# Patient Record
Sex: MALE | Race: WHITE | NOT HISPANIC OR LATINO | URBAN - METROPOLITAN AREA
[De-identification: names, ages, dates, MRNs, and addresses within clinical notes are randomized per-mention and may not be internally consistent; named-entity substitution may affect disease eponyms.]

---

## 2023-09-18 ENCOUNTER — OFFICE (AMBULATORY)
Dept: URBAN - METROPOLITAN AREA PATHOLOGY 4 | Facility: PATHOLOGY | Age: 63
End: 2023-09-18
Payer: COMMERCIAL

## 2023-09-18 ENCOUNTER — ON CAMPUS - OUTPATIENT (AMBULATORY)
Dept: URBAN - METROPOLITAN AREA HOSPITAL 2 | Facility: HOSPITAL | Age: 63
End: 2023-09-18
Payer: COMMERCIAL

## 2023-09-18 VITALS
HEIGHT: 69 IN | SYSTOLIC BLOOD PRESSURE: 113 MMHG | SYSTOLIC BLOOD PRESSURE: 108 MMHG | SYSTOLIC BLOOD PRESSURE: 111 MMHG | HEART RATE: 66 BPM | SYSTOLIC BLOOD PRESSURE: 130 MMHG | DIASTOLIC BLOOD PRESSURE: 94 MMHG | HEART RATE: 67 BPM | HEART RATE: 71 BPM | DIASTOLIC BLOOD PRESSURE: 84 MMHG | OXYGEN SATURATION: 97 % | DIASTOLIC BLOOD PRESSURE: 88 MMHG | DIASTOLIC BLOOD PRESSURE: 80 MMHG | OXYGEN SATURATION: 95 % | HEART RATE: 62 BPM | HEART RATE: 57 BPM | DIASTOLIC BLOOD PRESSURE: 83 MMHG | SYSTOLIC BLOOD PRESSURE: 140 MMHG | SYSTOLIC BLOOD PRESSURE: 112 MMHG | SYSTOLIC BLOOD PRESSURE: 114 MMHG | OXYGEN SATURATION: 96 % | TEMPERATURE: 98.4 F | WEIGHT: 243 LBS | HEART RATE: 63 BPM | RESPIRATION RATE: 18 BRPM | HEART RATE: 70 BPM | RESPIRATION RATE: 16 BRPM | OXYGEN SATURATION: 99 % | DIASTOLIC BLOOD PRESSURE: 75 MMHG | DIASTOLIC BLOOD PRESSURE: 86 MMHG | SYSTOLIC BLOOD PRESSURE: 117 MMHG

## 2023-09-18 DIAGNOSIS — D12.2 BENIGN NEOPLASM OF ASCENDING COLON: ICD-10-CM

## 2023-09-18 DIAGNOSIS — Z86.010 PERSONAL HISTORY OF COLONIC POLYPS: ICD-10-CM

## 2023-09-18 DIAGNOSIS — K62.1 RECTAL POLYP: ICD-10-CM

## 2023-09-18 DIAGNOSIS — Z09 ENCOUNTER FOR FOLLOW-UP EXAMINATION AFTER COMPLETED TREATMEN: ICD-10-CM

## 2023-09-18 PROBLEM — K63.5 POLYP OF COLON: Status: ACTIVE | Noted: 2023-09-18

## 2023-09-18 LAB
GI HISTOLOGY: A. UNSPECIFIED: (no result)
GI HISTOLOGY: B. UNSPECIFIED: (no result)
GI HISTOLOGY: C. UNSPECIFIED: (no result)
GI HISTOLOGY: PDF REPORT: (no result)

## 2023-09-18 PROCEDURE — 45385 COLONOSCOPY W/LESION REMOVAL: CPT | Mod: 33 | Performed by: INTERNAL MEDICINE

## 2023-09-18 PROCEDURE — 88305 TISSUE EXAM BY PATHOLOGIST: CPT | Performed by: INTERNAL MEDICINE

## 2023-09-18 RX ADMIN — ONDANSETRON HYDROCHLORIDE 4 MG: 4 SOLUTION ORAL at 10:19

## 2024-04-23 ENCOUNTER — APPOINTMENT (OUTPATIENT)
Dept: CT IMAGING | Facility: HOSPITAL | Age: 64
End: 2024-04-23
Payer: COMMERCIAL

## 2024-04-23 ENCOUNTER — APPOINTMENT (OUTPATIENT)
Dept: MRI IMAGING | Facility: HOSPITAL | Age: 64
End: 2024-04-23
Payer: COMMERCIAL

## 2024-04-23 ENCOUNTER — HOSPITAL ENCOUNTER (OUTPATIENT)
Facility: HOSPITAL | Age: 64
Setting detail: OBSERVATION
LOS: 1 days | Discharge: HOME | End: 2024-04-25
Attending: EMERGENCY MEDICINE
Payer: COMMERCIAL

## 2024-04-23 DIAGNOSIS — R55 NEAR SYNCOPE: ICD-10-CM

## 2024-04-23 DIAGNOSIS — R00.1 BRADYCARDIA: ICD-10-CM

## 2024-04-23 DIAGNOSIS — R42 DIZZINESS: Primary | ICD-10-CM

## 2024-04-23 PROBLEM — J45.909 ASTHMA: Status: ACTIVE | Noted: 2024-04-23

## 2024-04-23 PROBLEM — G47.33 OSA (OBSTRUCTIVE SLEEP APNEA): Status: ACTIVE | Noted: 2024-04-23

## 2024-04-23 PROBLEM — E66.9 OBESITY (BMI 30-39.9): Status: ACTIVE | Noted: 2024-04-23

## 2024-04-23 LAB
ANION GAP SERPL CALCULATED.3IONS-SCNC: 12.2 MMOL/L (ref 5–15)
BASOPHILS # BLD AUTO: 0.04 10*3/MM3 (ref 0–0.2)
BASOPHILS NFR BLD AUTO: 0.6 % (ref 0–1.5)
BUN SERPL-MCNC: 12 MG/DL (ref 8–23)
BUN/CREAT SERPL: 16.9 (ref 7–25)
CALCIUM SPEC-SCNC: 9.3 MG/DL (ref 8.6–10.5)
CHLORIDE SERPL-SCNC: 103 MMOL/L (ref 98–107)
CO2 SERPL-SCNC: 22.8 MMOL/L (ref 22–29)
CREAT SERPL-MCNC: 0.71 MG/DL (ref 0.76–1.27)
DEPRECATED RDW RBC AUTO: 42.9 FL (ref 37–54)
EGFRCR SERPLBLD CKD-EPI 2021: 103.1 ML/MIN/1.73
EOSINOPHIL # BLD AUTO: 0.21 10*3/MM3 (ref 0–0.4)
EOSINOPHIL NFR BLD AUTO: 3.2 % (ref 0.3–6.2)
ERYTHROCYTE [DISTWIDTH] IN BLOOD BY AUTOMATED COUNT: 12.5 % (ref 12.3–15.4)
GLUCOSE BLDC GLUCOMTR-MCNC: 156 MG/DL (ref 70–130)
GLUCOSE SERPL-MCNC: 146 MG/DL (ref 65–99)
HCT VFR BLD AUTO: 49.4 % (ref 37.5–51)
HGB BLD-MCNC: 16.3 G/DL (ref 13–17.7)
IMM GRANULOCYTES # BLD AUTO: 0.01 10*3/MM3 (ref 0–0.05)
IMM GRANULOCYTES NFR BLD AUTO: 0.2 % (ref 0–0.5)
LYMPHOCYTES # BLD AUTO: 2.21 10*3/MM3 (ref 0.7–3.1)
LYMPHOCYTES NFR BLD AUTO: 34.1 % (ref 19.6–45.3)
MCH RBC QN AUTO: 30.4 PG (ref 26.6–33)
MCHC RBC AUTO-ENTMCNC: 33 G/DL (ref 31.5–35.7)
MCV RBC AUTO: 92 FL (ref 79–97)
MONOCYTES # BLD AUTO: 0.52 10*3/MM3 (ref 0.1–0.9)
MONOCYTES NFR BLD AUTO: 8 % (ref 5–12)
NEUTROPHILS NFR BLD AUTO: 3.5 10*3/MM3 (ref 1.7–7)
NEUTROPHILS NFR BLD AUTO: 53.9 % (ref 42.7–76)
PLATELET # BLD AUTO: 234 10*3/MM3 (ref 140–450)
PMV BLD AUTO: 9.9 FL (ref 6–12)
POTASSIUM SERPL-SCNC: 4 MMOL/L (ref 3.5–5.2)
QT INTERVAL: 432 MS
QTC INTERVAL: 382 MS
RBC # BLD AUTO: 5.37 10*6/MM3 (ref 4.14–5.8)
SODIUM SERPL-SCNC: 138 MMOL/L (ref 136–145)
TROPONIN T SERPL HS-MCNC: 7 NG/L
TSH SERPL DL<=0.05 MIU/L-ACNC: 0.54 UIU/ML (ref 0.27–4.2)
WBC NRBC COR # BLD AUTO: 6.49 10*3/MM3 (ref 3.4–10.8)

## 2024-04-23 PROCEDURE — 80048 BASIC METABOLIC PNL TOTAL CA: CPT | Performed by: EMERGENCY MEDICINE

## 2024-04-23 PROCEDURE — 85025 COMPLETE CBC W/AUTO DIFF WBC: CPT | Performed by: EMERGENCY MEDICINE

## 2024-04-23 PROCEDURE — 25010000002 ONDANSETRON PER 1 MG: Performed by: EMERGENCY MEDICINE

## 2024-04-23 PROCEDURE — 99285 EMERGENCY DEPT VISIT HI MDM: CPT | Performed by: EMERGENCY MEDICINE

## 2024-04-23 PROCEDURE — 25810000003 SODIUM CHLORIDE 0.9 % SOLUTION: Performed by: INTERNAL MEDICINE

## 2024-04-23 PROCEDURE — 25010000002 LORAZEPAM PER 2 MG: Performed by: NURSE PRACTITIONER

## 2024-04-23 PROCEDURE — 25810000003 SODIUM CHLORIDE 0.9 % SOLUTION: Performed by: NURSE PRACTITIONER

## 2024-04-23 PROCEDURE — 93005 ELECTROCARDIOGRAM TRACING: CPT | Performed by: EMERGENCY MEDICINE

## 2024-04-23 PROCEDURE — 94660 CPAP INITIATION&MGMT: CPT

## 2024-04-23 PROCEDURE — 94799 UNLISTED PULMONARY SVC/PX: CPT

## 2024-04-23 PROCEDURE — 25010000002 ENOXAPARIN PER 10 MG: Performed by: INTERNAL MEDICINE

## 2024-04-23 PROCEDURE — 82948 REAGENT STRIP/BLOOD GLUCOSE: CPT

## 2024-04-23 PROCEDURE — 25810000003 SODIUM CHLORIDE 0.9 % SOLUTION: Performed by: EMERGENCY MEDICINE

## 2024-04-23 PROCEDURE — 96375 TX/PRO/DX INJ NEW DRUG ADDON: CPT

## 2024-04-23 PROCEDURE — G0378 HOSPITAL OBSERVATION PER HR: HCPCS

## 2024-04-23 PROCEDURE — 99285 EMERGENCY DEPT VISIT HI MDM: CPT

## 2024-04-23 PROCEDURE — 96374 THER/PROPH/DIAG INJ IV PUSH: CPT

## 2024-04-23 PROCEDURE — 96372 THER/PROPH/DIAG INJ SC/IM: CPT

## 2024-04-23 PROCEDURE — 70551 MRI BRAIN STEM W/O DYE: CPT

## 2024-04-23 PROCEDURE — 70450 CT HEAD/BRAIN W/O DYE: CPT

## 2024-04-23 PROCEDURE — 84484 ASSAY OF TROPONIN QUANT: CPT | Performed by: EMERGENCY MEDICINE

## 2024-04-23 PROCEDURE — 84443 ASSAY THYROID STIM HORMONE: CPT | Performed by: EMERGENCY MEDICINE

## 2024-04-23 PROCEDURE — 96376 TX/PRO/DX INJ SAME DRUG ADON: CPT

## 2024-04-23 RX ORDER — GUAIFENESIN 600 MG/1
600 TABLET, EXTENDED RELEASE ORAL DAILY
Status: DISCONTINUED | OUTPATIENT
Start: 2024-04-24 | End: 2024-04-25 | Stop reason: HOSPADM

## 2024-04-23 RX ORDER — ASPIRIN 81 MG/1
81 TABLET ORAL DAILY
COMMUNITY

## 2024-04-23 RX ORDER — MECLIZINE HYDROCHLORIDE 25 MG/1
25 TABLET ORAL ONCE
Status: COMPLETED | OUTPATIENT
Start: 2024-04-23 | End: 2024-04-23

## 2024-04-23 RX ORDER — BISACODYL 5 MG/1
5 TABLET, DELAYED RELEASE ORAL DAILY PRN
Status: DISCONTINUED | OUTPATIENT
Start: 2024-04-23 | End: 2024-04-25 | Stop reason: HOSPADM

## 2024-04-23 RX ORDER — MECLIZINE HYDROCHLORIDE 25 MG/1
12.5 TABLET ORAL EVERY 8 HOURS SCHEDULED
Status: DISCONTINUED | OUTPATIENT
Start: 2024-04-23 | End: 2024-04-25 | Stop reason: HOSPADM

## 2024-04-23 RX ORDER — SODIUM CHLORIDE 9 MG/ML
40 INJECTION, SOLUTION INTRAVENOUS AS NEEDED
Status: DISCONTINUED | OUTPATIENT
Start: 2024-04-23 | End: 2024-04-25 | Stop reason: HOSPADM

## 2024-04-23 RX ORDER — ENOXAPARIN SODIUM 100 MG/ML
40 INJECTION SUBCUTANEOUS
Status: DISCONTINUED | OUTPATIENT
Start: 2024-04-23 | End: 2024-04-25 | Stop reason: HOSPADM

## 2024-04-23 RX ORDER — GUAIFENESIN 600 MG/1
600 TABLET, EXTENDED RELEASE ORAL DAILY
COMMUNITY

## 2024-04-23 RX ORDER — MONTELUKAST SODIUM 10 MG/1
10 TABLET ORAL DAILY
Status: DISCONTINUED | OUTPATIENT
Start: 2024-04-24 | End: 2024-04-25 | Stop reason: HOSPADM

## 2024-04-23 RX ORDER — NITROGLYCERIN 0.4 MG/1
0.4 TABLET SUBLINGUAL
Status: DISCONTINUED | OUTPATIENT
Start: 2024-04-23 | End: 2024-04-25 | Stop reason: HOSPADM

## 2024-04-23 RX ORDER — SODIUM CHLORIDE 9 MG/ML
100 INJECTION, SOLUTION INTRAVENOUS CONTINUOUS
Status: DISCONTINUED | OUTPATIENT
Start: 2024-04-23 | End: 2024-04-25 | Stop reason: HOSPADM

## 2024-04-23 RX ORDER — LORAZEPAM 2 MG/ML
1 INJECTION INTRAMUSCULAR ONCE
Status: COMPLETED | OUTPATIENT
Start: 2024-04-23 | End: 2024-04-23

## 2024-04-23 RX ORDER — ONDANSETRON 2 MG/ML
4 INJECTION INTRAMUSCULAR; INTRAVENOUS ONCE
Status: COMPLETED | OUTPATIENT
Start: 2024-04-23 | End: 2024-04-23

## 2024-04-23 RX ORDER — POLYETHYLENE GLYCOL 3350 17 G/17G
17 POWDER, FOR SOLUTION ORAL DAILY PRN
Status: DISCONTINUED | OUTPATIENT
Start: 2024-04-23 | End: 2024-04-25 | Stop reason: HOSPADM

## 2024-04-23 RX ORDER — BISACODYL 10 MG
10 SUPPOSITORY, RECTAL RECTAL DAILY PRN
Status: DISCONTINUED | OUTPATIENT
Start: 2024-04-23 | End: 2024-04-25 | Stop reason: HOSPADM

## 2024-04-23 RX ORDER — SODIUM CHLORIDE 0.9 % (FLUSH) 0.9 %
10 SYRINGE (ML) INJECTION AS NEEDED
Status: DISCONTINUED | OUTPATIENT
Start: 2024-04-23 | End: 2024-04-25 | Stop reason: HOSPADM

## 2024-04-23 RX ORDER — LORATADINE 10 MG/1
10 TABLET ORAL DAILY
COMMUNITY

## 2024-04-23 RX ORDER — SODIUM CHLORIDE 0.9 % (FLUSH) 0.9 %
10 SYRINGE (ML) INJECTION EVERY 12 HOURS SCHEDULED
Status: DISCONTINUED | OUTPATIENT
Start: 2024-04-23 | End: 2024-04-25 | Stop reason: HOSPADM

## 2024-04-23 RX ORDER — CETIRIZINE HYDROCHLORIDE 10 MG/1
10 TABLET ORAL DAILY
Status: DISCONTINUED | OUTPATIENT
Start: 2024-04-24 | End: 2024-04-25 | Stop reason: HOSPADM

## 2024-04-23 RX ORDER — ONDANSETRON 2 MG/ML
4 INJECTION INTRAMUSCULAR; INTRAVENOUS EVERY 6 HOURS PRN
Status: DISCONTINUED | OUTPATIENT
Start: 2024-04-23 | End: 2024-04-25 | Stop reason: HOSPADM

## 2024-04-23 RX ORDER — ALBUTEROL SULFATE 2.5 MG/3ML
2.5 SOLUTION RESPIRATORY (INHALATION) EVERY 6 HOURS PRN
Status: DISCONTINUED | OUTPATIENT
Start: 2024-04-23 | End: 2024-04-25 | Stop reason: HOSPADM

## 2024-04-23 RX ORDER — ACETAMINOPHEN 500 MG
1000 TABLET ORAL EVERY 8 HOURS PRN
Status: DISCONTINUED | OUTPATIENT
Start: 2024-04-23 | End: 2024-04-25 | Stop reason: HOSPADM

## 2024-04-23 RX ORDER — AMOXICILLIN 250 MG
2 CAPSULE ORAL 2 TIMES DAILY PRN
Status: DISCONTINUED | OUTPATIENT
Start: 2024-04-23 | End: 2024-04-25 | Stop reason: HOSPADM

## 2024-04-23 RX ORDER — MONTELUKAST SODIUM 10 MG/1
10 TABLET ORAL DAILY
COMMUNITY

## 2024-04-23 RX ADMIN — MECLIZINE HYDROCHLORIDE 12.5 MG: 25 TABLET ORAL at 23:01

## 2024-04-23 RX ADMIN — LORAZEPAM 1 MG: 2 INJECTION INTRAMUSCULAR; INTRAVENOUS at 21:50

## 2024-04-23 RX ADMIN — SODIUM CHLORIDE 1000 ML: 9 INJECTION, SOLUTION INTRAVENOUS at 10:54

## 2024-04-23 RX ADMIN — ONDANSETRON 4 MG: 2 INJECTION INTRAMUSCULAR; INTRAVENOUS at 11:24

## 2024-04-23 RX ADMIN — SODIUM CHLORIDE 100 ML/HR: 9 INJECTION, SOLUTION INTRAVENOUS at 23:01

## 2024-04-23 RX ADMIN — ENOXAPARIN SODIUM 40 MG: 100 INJECTION SUBCUTANEOUS at 17:47

## 2024-04-23 RX ADMIN — Medication 10 ML: at 23:04

## 2024-04-23 RX ADMIN — MECLIZINE HYDROCHLORIDE 25 MG: 25 TABLET ORAL at 11:47

## 2024-04-23 RX ADMIN — ONDANSETRON 4 MG: 2 INJECTION INTRAMUSCULAR; INTRAVENOUS at 13:54

## 2024-04-23 RX ADMIN — SODIUM CHLORIDE 1000 ML: 0.9 INJECTION, SOLUTION INTRAVENOUS at 18:02

## 2024-04-23 NOTE — PLAN OF CARE
Goal Outcome Evaluation:   Patient admitted from free standing ER, complains of extreme dizziness with movement and activity, bp elevated. Patient remains bradycardic, room air afebrile. Orders obtained from Dr. Almeida, orthostatic bp  obtained before bolus given, meclizine ordered q8, npo at midnight for poss stress test in the morning

## 2024-04-23 NOTE — FSED PROVIDER NOTE
"Subjective   History of Present Illness  Patient with compmlaint of sudden onset lightheadedness, dizziness and diaphoresis. No precipitating or rleieving factors. Patient wear hearing aids and was treated for \"ear infection\" over a week ago. No neck pain, abdominal pain, chest pain, shortness of breath. Patient very nauseous and vomiting. Feels like room spinning. No pattern to symptoms. No fever, shakes, chills, cough, congestion.     History provided by:  Patient and friend   used: No        Review of Systems   All other systems reviewed and are negative.      No past medical history on file.    Allergies   Allergen Reactions    Amoxicillin Anaphylaxis    Asa [Aspirin] Anaphylaxis    Penicillins Anaphylaxis    Voltaren [Diclofenac Sodium] Anaphylaxis       No past surgical history on file.    No family history on file.    Social History     Socioeconomic History    Marital status:            Objective   Physical Exam  Vitals and nursing note reviewed.   HENT:      Right Ear: Tympanic membrane normal.      Left Ear: Tympanic membrane normal.      Nose: Nose normal.      Mouth/Throat:      Mouth: Mucous membranes are moist.      Pharynx: Oropharynx is clear.   Eyes:      Extraocular Movements: Extraocular movements intact.      Pupils: Pupils are equal, round, and reactive to light.   Cardiovascular:      Rate and Rhythm: Regular rhythm. Bradycardia present.      Pulses: Normal pulses.      Heart sounds: Normal heart sounds.   Pulmonary:      Effort: Pulmonary effort is normal.      Breath sounds: Normal breath sounds.   Abdominal:      General: Abdomen is flat.   Musculoskeletal:         General: Normal range of motion.      Cervical back: Normal range of motion and neck supple.   Skin:     General: Skin is warm and dry.      Capillary Refill: Capillary refill takes less than 2 seconds.   Neurological:      Mental Status: He is alert.      Motor: Weakness present.      Comments: " Bilateral fatiguable horizontal nystagmus; left gaze more pronounced than right gaze   Psychiatric:         Mood and Affect: Mood normal.         Behavior: Behavior normal.         Thought Content: Thought content normal.         ECG 12 Lead      Date/Time: 4/23/2024 12:07 PM    Performed by: Rashid Henderson MD  Authorized by: Rashid Henderson MD  Interpreted by ED physician  Comparison: not compared with previous ECG   Rhythm: sinus bradycardia  Rate: bradycardic  BPM: 47  QRS axis: left  Conduction: conduction normal  ST Segments: ST segments normal  T Waves: T waves normal  Other: no other findings  Clinical impression: abnormal ECG               ED Course                                           Medical Decision Making  Concern for symptomatic bradycardia, vertigo, posterior cva, metabolic derangement    D/w dr. Ruggiero. Accept admission. D/w patient and family. Agrees with plan.    Problems Addressed:  Bradycardia: complicated acute illness or injury  Dizziness: complicated acute illness or injury  Near syncope: complicated acute illness or injury    Amount and/or Complexity of Data Reviewed  Labs: ordered.  Radiology: ordered.  ECG/medicine tests: ordered and independent interpretation performed.    Risk  Prescription drug management.  Decision regarding hospitalization.        Final diagnoses:   Dizziness   Near syncope   Bradycardia       ED Disposition  ED Disposition       ED Disposition   Decision to Admit    Condition   --    Comment   Level of Care: Telemetry [5]   Admitting Physician: CHADD RUGGIERO [[id number]]   Attending Physician: CHADD RUGGIERO [[id number]]   Patient Class: Observation [104]                 No follow-up provider specified.       Medication List      No changes were made to your prescriptions during this visit.

## 2024-04-23 NOTE — H&P
"[facility] Medicine Services  History & Physical    Patient Name: Chadwick Lal  : 1960  MRN: [id number]  Primary Care Physician:  Hiram Mack  Date of admission: 2024  Date and Time of Service: 2024 at 1925    Subjective      Chief Complaint: vertigo    History of Present Illness: Chadwick Lal is a 64 y.o. male with a CMH of obesity BMI 37.54, controlled asthma, obstructive sleep apnea reports uses CPAP intermittently due to work schedule who presented to [facility] emergency department, on 2024 with complaints of sudden onset of lightheadedness dizziness and diaphoresis.  He denies any chest pain jaw arm or back pain or shortness of breath.  He reports he feels nauseous but denied vomiting.  He describes the dizziness as \"room spinning\".  Denies any fevers chills cough or congestion.  He reports he has some mild sinus congestion but that this is chronic.  He reports he has never had a cardiac evaluation.  He reports his mother had heart disease.  2 EKGs were performed in the emergency department each showing bradycardia with a heart rate of 47 no ischemic changes noted.  CT head per radiology showed no acute intracranial abnormality but did show sinusitis.  WBC is not elevated and he is afebrile he denies any nasal congestion or rhinorrhea.  Denies any visual changes or headache.  Labs today showed a troponin of 7, creatinine 0.71 glucose 146 TSH 0.541.  He was given 1 L fluid bolus in the ED as well as 4 mg Zofran and 25 mg meclizine in the emergency department.  Cardiology was consulted.  2D echo ordered in a.m., MRI brain ordered and pending.  He will be admitted for further evaluation and treatment.  Review of Systems   Constitutional: Negative.    HENT:  Positive for sinus pressure.    Eyes: Negative.    Respiratory: Negative.     Cardiovascular: Negative.    Gastrointestinal: Negative.    Endocrine: Negative.    Genitourinary: Negative.  "   Musculoskeletal: Negative.    Skin: Negative.    Allergic/Immunologic: Negative.    Neurological:  Positive for dizziness and light-headedness.   Hematological: Negative.    Psychiatric/Behavioral: Negative.     All other systems reviewed and are negative.      Personal History     Past Medical History:   Diagnosis Date    Asthma     Obesity     LAURA (obstructive sleep apnea)        History reviewed. No pertinent surgical history.    Family History: family history includes Heart disease in his mother. Otherwise pertinent FHx was reviewed and not pertinent to current issue.    Social History:  reports that he has never smoked. He has been exposed to tobacco smoke. He does not have any smokeless tobacco history on file. He reports that he does not drink alcohol and does not use drugs.    Home Medications:  Prior to Admission Medications       Prescriptions Last Dose Informant Patient Reported? Taking?    aspirin 81 MG EC tablet 4/23/2024  Yes Yes    Take 1 tablet by mouth Daily.    guaiFENesin (Mucinex) 600 MG 12 hr tablet 4/23/2024  Yes Yes    Take 1 tablet by mouth Daily.    loratadine (CLARITIN) 10 MG tablet 4/23/2024  Yes Yes    Take 1 tablet by mouth Daily.    montelukast (SINGULAIR) 10 MG tablet 4/23/2024  Yes Yes    Take 1 tablet by mouth Daily.              Allergies:  Allergies   Allergen Reactions    Amoxicillin Anaphylaxis    Asa [Aspirin] Anaphylaxis     Per patient; he can only tolerate 81mg. Any higher dose he can go into anaphylaxis per [facility]     Penicillins Anaphylaxis    Voltaren [Diclofenac Sodium] Anaphylaxis       Objective      Vitals:   Temp:  [97.5 °F (36.4 °C)] 97.5 °F (36.4 °C)  Heart Rate:  [48-81] 62  Resp:  [16-18] 16  BP: (121-152)/(86-96) 136/92  Body mass index is 37.54 kg/m².  Physical Exam  Vitals reviewed.   Constitutional:       Appearance: Normal appearance. He is obese.   HENT:      Head: Normocephalic and atraumatic.      Right Ear: External ear normal.      Left Ear:  External ear normal.      Nose: Nose normal.      Mouth/Throat:      Mouth: Mucous membranes are moist.   Eyes:      Extraocular Movements: Extraocular movements intact.   Cardiovascular:      Rate and Rhythm: Normal rate and regular rhythm.      Pulses: Normal pulses.      Heart sounds: Normal heart sounds.   Pulmonary:      Effort: Pulmonary effort is normal.      Breath sounds: Normal breath sounds.   Abdominal:      Palpations: Abdomen is soft.   Genitourinary:     Comments: deferred  Musculoskeletal:         General: Normal range of motion.      Cervical back: Normal range of motion and neck supple.   Skin:     General: Skin is warm and dry.   Neurological:      General: No focal deficit present.      Mental Status: He is alert and oriented to person, place, and time.   Psychiatric:         Mood and Affect: Mood normal.         Behavior: Behavior normal.         Thought Content: Thought content normal.         Judgment: Judgment normal.         Diagnostic Data:  Lab Results (last 24 hours)       Procedure Component Value Units Date/Time    TSH [[id number]]  (Normal) Collected: 04/23/24 1047    Specimen: Blood Updated: 04/23/24 1800     TSH 0.541 uIU/mL     Basic Metabolic Panel [[id number]]  (Abnormal) Collected: 04/23/24 1047    Specimen: Blood Updated: 04/23/24 1114     Glucose 146 mg/dL      BUN 12 mg/dL      Creatinine 0.71 mg/dL      Sodium 138 mmol/L      Potassium 4.0 mmol/L      Chloride 103 mmol/L      CO2 22.8 mmol/L      Calcium 9.3 mg/dL      BUN/Creatinine Ratio 16.9     Anion Gap 12.2 mmol/L      eGFR 103.1 mL/min/1.73     Narrative:      GFR Normal >60  Chronic Kidney Disease <60  Kidney Failure <15      Single High Sensitivity Troponin T [[id number]]  (Normal) Collected: 04/23/24 1047    Specimen: Blood Updated: 04/23/24 1114     HS Troponin T 7 ng/L     Narrative:      High Sensitive Troponin T Reference Range:  <14.0 ng/L- Negative Female for AMI  <22.0 ng/L- Negative Male for AMI  >=14 -  Abnormal Female indicating possible myocardial injury.  >=22 - Abnormal Male indicating possible myocardial injury.   Clinicians would have to utilize clinical acumen, EKG, Troponin, and serial changes to determine if it is an Acute Myocardial Infarction or myocardial injury due to an underlying chronic condition.         CBC & Differential [[id number]]  (Normal) Collected: 04/23/24 1047    Specimen: Blood Updated: 04/23/24 1056    Narrative:      The following orders were created for panel order CBC & Differential.  Procedure                               Abnormality         Status                     ---------                               -----------         ------                     CBC Auto Differential[[id number]]        Normal              Final result                 Please view results for these tests on the individual orders.    CBC Auto Differential [[id number]]  (Normal) Collected: 04/23/24 1047    Specimen: Blood Updated: 04/23/24 1056     WBC 6.49 10*3/mm3      RBC 5.37 10*6/mm3      Hemoglobin 16.3 g/dL      Hematocrit 49.4 %      MCV 92.0 fL      MCH 30.4 pg      MCHC 33.0 g/dL      RDW 12.5 %      RDW-SD 42.9 fl      MPV 9.9 fL      Platelets 234 10*3/mm3      Neutrophil % 53.9 %      Lymphocyte % 34.1 %      Monocyte % 8.0 %      Eosinophil % 3.2 %      Basophil % 0.6 %      Immature Grans % 0.2 %      Neutrophils, Absolute 3.50 10*3/mm3      Lymphocytes, Absolute 2.21 10*3/mm3      Monocytes, Absolute 0.52 10*3/mm3      Eosinophils, Absolute 0.21 10*3/mm3      Basophils, Absolute 0.04 10*3/mm3      Immature Grans, Absolute 0.01 10*3/mm3     POC Glucose Once [[id number]]  (Abnormal) Collected: 04/23/24 1045    Specimen: Blood Updated: 04/23/24 1047     Glucose 156 mg/dL      Comment: Serial Number: [id number]:  [id number]                Imaging Results (Last 24 Hours)       Procedure Component Value Units Date/Time    CT Head Without Contrast [[id number]] Collected: 04/23/24 1138     Updated:  04/23/24 1143    Narrative:      CT HEAD WO CONTRAST    Date of Exam: 4/23/2024 11:30 AM EDT    Indication: Dizziness, non-specific.    Comparison: None available.    Technique: Axial CT images were obtained of the head without contrast administration.  Coronal reconstructions were performed.  Automated exposure control and iterative reconstruction methods were used.      Findings:  There is no evidence of hemorrhage. There is no mass effect or midline shift.    There is no extracerebral collection.    Ventricles are normal in size and configuration for patient's stated age.     Posterior fossa is within normal limits.    Calvarium and skull base appear intact.  There is almost complete opacification of frontal, sphenoid and ethmoid sinuses. Maxillary sinuses are partially outside of the field-of-view. There appears to be maxillary antrostomy changes      Impression:      Impression:  Number  No acute intracranial abnormality    Sinusitis      Electronically Signed: Jaskaran Landrum MD    4/23/2024 11:41 AM EDT    Workstation ID: [id number]              Assessment & Plan        This is a 64 y.o. male with:    Active and Resolved Problems  Active Hospital Problems    Diagnosis  POA    **Bradycardia [R00.1]  Yes     Priority: High    Vertigo [R42]  Yes     Priority: High    Obesity (BMI 30-39.9) [E66.9]  Yes    Asthma [J45.909]  Yes    LAURA (obstructive sleep apnea) [G47.33]  Yes      Resolved Hospital Problems   No resolved problems to display.       Sinus bradycardia, no ischemic changes seen on EKG, etiology undetermined, continuous cardiac monitoring, cardiology consulted, 2D echo in a.m., cardiology considering stress testing n.p.o. past midnight, normal saline at 100 cc/h    Vertigo, CT head negative per radiology except for sinusitis, WBC not elevated afebrile denies nasal congestion or rhinorrhea may be secondary to bradycardia see plan above, MRI brain ordered and pending, recently saw ENT for right otitis  externa.  Fall precautions meclizine 25 mg every 8 hours    Obesity BMI 37.54 lifestyle management education lipid panel in a.m.    Asthma controlled, continue use of inhalers for 6 months, albuterol INH as needed if needed on home Singulair, Claritin and Mucinex    Obstructive sleep apnea, intermittently compliant with CPAP, CPAP ordered here    DVT prophylaxis:  Medical DVT prophylaxis orders are present.        The patient desires to be as follows:    CODE STATUS:    Code Status (Patient has no pulse and is not breathing): CPR (Attempt to Resuscitate)  Medical Interventions (Patient has pulse or is breathing): Full Support            Admission Status:  I believe this patient meets inpatient  status.    Expected Length of Stay: pending evaluation results     PDMP and Medication Dispenses via Sidebar reviewed and consistent with patient reported medications.    I discussed the patient's findings and my recommendations with patient and family.      Signature:     This document has been electronically signed by KENNEDY Price on April 23, 2024 20:29 EDT   [facility] Hospitalist Team

## 2024-04-23 NOTE — LETTER
April 25, 2024     Patient: Chadwick Lal   YOB: 1960   Date of Visit: 4/23/2024       To Whom It May Concern:    It is my medical opinion that Chadwick Lal may return to work on May 3rd, 2024 .           Sincerely,  Dr. Posada/ Dayana THRASHER  [facility]  [phone]      No name on file    CC:   No Recipients

## 2024-04-23 NOTE — ED NOTES
Report given to [facility] ems at this time. Pt being transferred to [facility] pcu  [phone]

## 2024-04-24 ENCOUNTER — APPOINTMENT (OUTPATIENT)
Dept: NUCLEAR MEDICINE | Facility: HOSPITAL | Age: 64
End: 2024-04-24
Payer: COMMERCIAL

## 2024-04-24 ENCOUNTER — APPOINTMENT (OUTPATIENT)
Dept: CARDIOLOGY | Facility: HOSPITAL | Age: 64
End: 2024-04-24
Payer: COMMERCIAL

## 2024-04-24 LAB
AMPHET+METHAMPHET UR QL: NEGATIVE
ANION GAP SERPL CALCULATED.3IONS-SCNC: 8 MMOL/L (ref 5–15)
BARBITURATES UR QL SCN: NEGATIVE
BASOPHILS # BLD AUTO: 0.04 10*3/MM3 (ref 0–0.2)
BASOPHILS NFR BLD AUTO: 0.5 % (ref 0–1.5)
BENZODIAZ UR QL SCN: NEGATIVE
BH CV ECHO MEAS - AO MAX PG: 8.6 MMHG
BH CV ECHO MEAS - AO MEAN PG: 5 MMHG
BH CV ECHO MEAS - AO V2 MAX: 147 CM/SEC
BH CV ECHO MEAS - AO V2 VTI: 32.7 CM
BH CV ECHO MEAS - AVA(I,D): 2.49 CM2
BH CV ECHO MEAS - EDV(CUBED): 157.5 ML
BH CV ECHO MEAS - EDV(MOD-SP2): 84.2 ML
BH CV ECHO MEAS - EDV(MOD-SP4): 93.2 ML
BH CV ECHO MEAS - EF(MOD-BP): 64 %
BH CV ECHO MEAS - EF(MOD-SP2): 63.9 %
BH CV ECHO MEAS - EF(MOD-SP4): 64.4 %
BH CV ECHO MEAS - ESV(CUBED): 59.3 ML
BH CV ECHO MEAS - ESV(MOD-SP2): 30.4 ML
BH CV ECHO MEAS - ESV(MOD-SP4): 33.2 ML
BH CV ECHO MEAS - FS: 27.8 %
BH CV ECHO MEAS - IVS/LVPW: 0.92 CM
BH CV ECHO MEAS - IVSD: 1.1 CM
BH CV ECHO MEAS - LA DIMENSION: 3.8 CM
BH CV ECHO MEAS - LAT PEAK E' VEL: 13.2 CM/SEC
BH CV ECHO MEAS - LV DIASTOLIC VOL/BSA (35-75): 41.8 CM2
BH CV ECHO MEAS - LV MASS(C)D: 249.4 GRAMS
BH CV ECHO MEAS - LV MAX PG: 7.5 MMHG
BH CV ECHO MEAS - LV MEAN PG: 4 MMHG
BH CV ECHO MEAS - LV SYSTOLIC VOL/BSA (12-30): 14.9 CM2
BH CV ECHO MEAS - LV V1 MAX: 137 CM/SEC
BH CV ECHO MEAS - LV V1 VTI: 25.9 CM
BH CV ECHO MEAS - LVIDD: 5.4 CM
BH CV ECHO MEAS - LVIDS: 3.9 CM
BH CV ECHO MEAS - LVOT AREA: 3.1 CM2
BH CV ECHO MEAS - LVOT DIAM: 2 CM
BH CV ECHO MEAS - LVPWD: 1.2 CM
BH CV ECHO MEAS - MED PEAK E' VEL: 9.6 CM/SEC
BH CV ECHO MEAS - MV A MAX VEL: 50.8 CM/SEC
BH CV ECHO MEAS - MV DEC SLOPE: 366 CM/SEC2
BH CV ECHO MEAS - MV DEC TIME: 0.19 SEC
BH CV ECHO MEAS - MV E MAX VEL: 88 CM/SEC
BH CV ECHO MEAS - MV E/A: 1.73
BH CV ECHO MEAS - MV MAX PG: 2.9 MMHG
BH CV ECHO MEAS - MV MEAN PG: 1 MMHG
BH CV ECHO MEAS - MV P1/2T: 68.2 MSEC
BH CV ECHO MEAS - MV V2 VTI: 31.6 CM
BH CV ECHO MEAS - MVA(P1/2T): 3.2 CM2
BH CV ECHO MEAS - MVA(VTI): 2.6 CM2
BH CV ECHO MEAS - PA V2 MAX: 88.5 CM/SEC
BH CV ECHO MEAS - PI END-D VEL: 51.3 CM/SEC
BH CV ECHO MEAS - PULM A REVS DUR: 0.17 SEC
BH CV ECHO MEAS - PULM A REVS VEL: 26.2 CM/SEC
BH CV ECHO MEAS - PULM DIAS VEL: 58.6 CM/SEC
BH CV ECHO MEAS - PULM S/D: 0.87
BH CV ECHO MEAS - PULM SYS VEL: 51.2 CM/SEC
BH CV ECHO MEAS - RAP SYSTOLE: 15 MMHG
BH CV ECHO MEAS - RV MAX PG: 2.35 MMHG
BH CV ECHO MEAS - RV V1 MAX: 76.6 CM/SEC
BH CV ECHO MEAS - RV V1 VTI: 15.3 CM
BH CV ECHO MEAS - RVDD: 4.1 CM
BH CV ECHO MEAS - RVSP: 24.1 MMHG
BH CV ECHO MEAS - SV(LVOT): 81.4 ML
BH CV ECHO MEAS - SV(MOD-SP2): 53.8 ML
BH CV ECHO MEAS - SV(MOD-SP4): 60 ML
BH CV ECHO MEAS - SVI(LVOT): 36.5 ML/M2
BH CV ECHO MEAS - SVI(MOD-SP2): 24.1 ML/M2
BH CV ECHO MEAS - SVI(MOD-SP4): 26.9 ML/M2
BH CV ECHO MEAS - TAPSE (>1.6): 2.48 CM
BH CV ECHO MEAS - TR MAX PG: 9.1 MMHG
BH CV ECHO MEAS - TR MAX VEL: 151 CM/SEC
BH CV ECHO MEASUREMENTS AVERAGE E/E' RATIO: 7.72
BH CV REST NUCLEAR ISOTOPE DOSE: 11 MCI
BH CV STRESS BP STAGE 1: NORMAL
BH CV STRESS BP STAGE 2: NORMAL
BH CV STRESS COMMENTS STAGE 1: NORMAL
BH CV STRESS COMMENTS STAGE 2: NORMAL
BH CV STRESS DOSE REGADENOSON STAGE 1: 0.4
BH CV STRESS DURATION MIN STAGE 1: 0
BH CV STRESS DURATION MIN STAGE 2: 4
BH CV STRESS DURATION SEC STAGE 1: 10
BH CV STRESS DURATION SEC STAGE 2: 0
BH CV STRESS HR STAGE 1: 50
BH CV STRESS HR STAGE 2: 89
BH CV STRESS NUCLEAR ISOTOPE DOSE: 29 MCI
BH CV STRESS PROTOCOL 1: NORMAL
BH CV STRESS RECOVERY BP: NORMAL MMHG
BH CV STRESS RECOVERY HR: 75 BPM
BH CV STRESS STAGE 1: 1
BH CV STRESS STAGE 2: 2
BH CV XLRA - RV BASE: 4.4 CM
BH CV XLRA - RV LENGTH: 7.8 CM
BH CV XLRA - RV MID: 3.3 CM
BH CV XLRA - TDI S': 17.2 CM/SEC
BUN SERPL-MCNC: 14 MG/DL (ref 8–23)
BUN/CREAT SERPL: 16.3 (ref 7–25)
CALCIUM SPEC-SCNC: 9 MG/DL (ref 8.6–10.5)
CANNABINOIDS SERPL QL: NEGATIVE
CHLORIDE SERPL-SCNC: 106 MMOL/L (ref 98–107)
CHOLEST SERPL-MCNC: 219 MG/DL (ref 0–200)
CO2 SERPL-SCNC: 29 MMOL/L (ref 22–29)
COCAINE UR QL: NEGATIVE
CREAT SERPL-MCNC: 0.86 MG/DL (ref 0.76–1.27)
DEPRECATED RDW RBC AUTO: 43 FL (ref 37–54)
EGFRCR SERPLBLD CKD-EPI 2021: 97.3 ML/MIN/1.73
EOSINOPHIL # BLD AUTO: 0.23 10*3/MM3 (ref 0–0.4)
EOSINOPHIL NFR BLD AUTO: 2.9 % (ref 0.3–6.2)
ERYTHROCYTE [DISTWIDTH] IN BLOOD BY AUTOMATED COUNT: 12.5 % (ref 12.3–15.4)
GLUCOSE SERPL-MCNC: 117 MG/DL (ref 65–99)
HBA1C MFR BLD: 5.8 % (ref 4.8–5.6)
HCT VFR BLD AUTO: 43.7 % (ref 37.5–51)
HDLC SERPL-MCNC: 23 MG/DL (ref 40–60)
HGB BLD-MCNC: 14.5 G/DL (ref 13–17.7)
IMM GRANULOCYTES # BLD AUTO: 0.02 10*3/MM3 (ref 0–0.05)
IMM GRANULOCYTES NFR BLD AUTO: 0.3 % (ref 0–0.5)
LDLC SERPL CALC-MCNC: 106 MG/DL (ref 0–100)
LDLC/HDLC SERPL: 3.98 {RATIO}
LEFT ATRIUM VOLUME INDEX: 27 ML/M2
LV EF NUC BP: 61 %
LYMPHOCYTES # BLD AUTO: 3.26 10*3/MM3 (ref 0.7–3.1)
LYMPHOCYTES NFR BLD AUTO: 40.8 % (ref 19.6–45.3)
MAGNESIUM SERPL-MCNC: 2.2 MG/DL (ref 1.6–2.4)
MAXIMAL PREDICTED HEART RATE: 157 BPM
MCH RBC QN AUTO: 30.8 PG (ref 26.6–33)
MCHC RBC AUTO-ENTMCNC: 33.2 G/DL (ref 31.5–35.7)
MCV RBC AUTO: 92.8 FL (ref 79–97)
METHADONE UR QL SCN: NEGATIVE
MONOCYTES # BLD AUTO: 0.74 10*3/MM3 (ref 0.1–0.9)
MONOCYTES NFR BLD AUTO: 9.3 % (ref 5–12)
NEUTROPHILS NFR BLD AUTO: 3.71 10*3/MM3 (ref 1.7–7)
NEUTROPHILS NFR BLD AUTO: 46.2 % (ref 42.7–76)
NRBC BLD AUTO-RTO: 0 /100 WBC (ref 0–0.2)
OPIATES UR QL: NEGATIVE
OXYCODONE UR QL SCN: NEGATIVE
PERCENT MAX PREDICTED HR: 56.69 %
PHOSPHATE SERPL-MCNC: 2.7 MG/DL (ref 2.5–4.5)
PLATELET # BLD AUTO: 205 10*3/MM3 (ref 140–450)
PMV BLD AUTO: 10.1 FL (ref 6–12)
POTASSIUM SERPL-SCNC: 4.1 MMOL/L (ref 3.5–5.2)
RBC # BLD AUTO: 4.71 10*6/MM3 (ref 4.14–5.8)
SINUS: 4 CM
SODIUM SERPL-SCNC: 143 MMOL/L (ref 136–145)
STRESS BASELINE BP: NORMAL MMHG
STRESS BASELINE HR: 50 BPM
STRESS PERCENT HR: 67 %
STRESS POST PEAK BP: NORMAL MMHG
STRESS POST PEAK HR: 89 BPM
STRESS TARGET HR: 133 BPM
TRIGL SERPL-MCNC: 522 MG/DL (ref 0–150)
TROPONIN T SERPL HS-MCNC: 10 NG/L
TSH SERPL DL<=0.05 MIU/L-ACNC: 1.27 UIU/ML (ref 0.27–4.2)
VLDLC SERPL-MCNC: 90 MG/DL (ref 5–40)
WBC NRBC COR # BLD AUTO: 8 10*3/MM3 (ref 3.4–10.8)

## 2024-04-24 PROCEDURE — 93018 CV STRESS TEST I&R ONLY: CPT | Performed by: INTERNAL MEDICINE

## 2024-04-24 PROCEDURE — G0378 HOSPITAL OBSERVATION PER HR: HCPCS

## 2024-04-24 PROCEDURE — 84443 ASSAY THYROID STIM HORMONE: CPT | Performed by: INTERNAL MEDICINE

## 2024-04-24 PROCEDURE — 94799 UNLISTED PULMONARY SVC/PX: CPT

## 2024-04-24 PROCEDURE — 97162 PT EVAL MOD COMPLEX 30 MIN: CPT

## 2024-04-24 PROCEDURE — A9502 TC99M TETROFOSMIN: HCPCS | Performed by: INTERNAL MEDICINE

## 2024-04-24 PROCEDURE — 80307 DRUG TEST PRSMV CHEM ANLYZR: CPT | Performed by: INTERNAL MEDICINE

## 2024-04-24 PROCEDURE — 80048 BASIC METABOLIC PNL TOTAL CA: CPT | Performed by: INTERNAL MEDICINE

## 2024-04-24 PROCEDURE — 83036 HEMOGLOBIN GLYCOSYLATED A1C: CPT | Performed by: NURSE PRACTITIONER

## 2024-04-24 PROCEDURE — 25010000002 ENOXAPARIN PER 10 MG: Performed by: INTERNAL MEDICINE

## 2024-04-24 PROCEDURE — 78452 HT MUSCLE IMAGE SPECT MULT: CPT

## 2024-04-24 PROCEDURE — 25010000002 REGADENOSON 0.4 MG/5ML SOLUTION: Performed by: INTERNAL MEDICINE

## 2024-04-24 PROCEDURE — 78452 HT MUSCLE IMAGE SPECT MULT: CPT | Performed by: INTERNAL MEDICINE

## 2024-04-24 PROCEDURE — 93306 TTE W/DOPPLER COMPLETE: CPT | Performed by: STUDENT IN AN ORGANIZED HEALTH CARE EDUCATION/TRAINING PROGRAM

## 2024-04-24 PROCEDURE — 84484 ASSAY OF TROPONIN QUANT: CPT | Performed by: NURSE PRACTITIONER

## 2024-04-24 PROCEDURE — 93306 TTE W/DOPPLER COMPLETE: CPT

## 2024-04-24 PROCEDURE — 99204 OFFICE O/P NEW MOD 45 MIN: CPT | Performed by: INTERNAL MEDICINE

## 2024-04-24 PROCEDURE — 96372 THER/PROPH/DIAG INJ SC/IM: CPT

## 2024-04-24 PROCEDURE — 83735 ASSAY OF MAGNESIUM: CPT | Performed by: INTERNAL MEDICINE

## 2024-04-24 PROCEDURE — 93017 CV STRESS TEST TRACING ONLY: CPT

## 2024-04-24 PROCEDURE — 80061 LIPID PANEL: CPT | Performed by: NURSE PRACTITIONER

## 2024-04-24 PROCEDURE — 84100 ASSAY OF PHOSPHORUS: CPT | Performed by: INTERNAL MEDICINE

## 2024-04-24 PROCEDURE — 0 TECHNETIUM TETROFOSMIN KIT: Performed by: INTERNAL MEDICINE

## 2024-04-24 PROCEDURE — 85025 COMPLETE CBC W/AUTO DIFF WBC: CPT | Performed by: INTERNAL MEDICINE

## 2024-04-24 RX ORDER — REGADENOSON 0.08 MG/ML
0.4 INJECTION, SOLUTION INTRAVENOUS
Status: COMPLETED | OUTPATIENT
Start: 2024-04-24 | End: 2024-04-24

## 2024-04-24 RX ADMIN — REGADENOSON 0.4 MG: 0.08 INJECTION, SOLUTION INTRAVENOUS at 13:21

## 2024-04-24 RX ADMIN — ENOXAPARIN SODIUM 40 MG: 100 INJECTION SUBCUTANEOUS at 15:38

## 2024-04-24 RX ADMIN — TETROFOSMIN 1 DOSE: 1.38 INJECTION, POWDER, LYOPHILIZED, FOR SOLUTION INTRAVENOUS at 13:21

## 2024-04-24 RX ADMIN — CETIRIZINE HYDROCHLORIDE 10 MG: 10 TABLET, FILM COATED ORAL at 08:05

## 2024-04-24 RX ADMIN — MONTELUKAST 10 MG: 10 TABLET, FILM COATED ORAL at 08:05

## 2024-04-24 RX ADMIN — MECLIZINE HYDROCHLORIDE 12.5 MG: 25 TABLET ORAL at 22:32

## 2024-04-24 RX ADMIN — MECLIZINE HYDROCHLORIDE 12.5 MG: 25 TABLET ORAL at 15:38

## 2024-04-24 RX ADMIN — GUAIFENESIN 600 MG: 600 TABLET, EXTENDED RELEASE ORAL at 08:05

## 2024-04-24 RX ADMIN — TETROFOSMIN 1 DOSE: 1.38 INJECTION, POWDER, LYOPHILIZED, FOR SOLUTION INTRAVENOUS at 12:30

## 2024-04-24 RX ADMIN — Medication 10 ML: at 22:32

## 2024-04-24 NOTE — CONSULTS
Referring Provider: Efren Posada MD    Reason for Consultation:      Feelings of unsteadiness  Sinus bradycardia  Shortness of breath      Patient Care Team:  Souleymane Rosa MD as PCP - General (Internal Medicine)      SUBJECTIVE     Chief Complaint: Feelings of being unsteady, shortness of breath    History of present illness:  Chadwick Lal is a 64 y.o. male with no prior known cardiac history who presented to [facility] with complaint of feelings of unsteadiness, shortness of breath and found to be bradycardic.    Patient reports yesterday he had a sudden onset of feeling unsteady and off-balance.  He denies dizziness or room spinning.  He denies associated chest pain but does say that he felt somewhat short of breath.    The patient says he is never had this sensation before.  Patient has had no prior cardiovascular evaluation.    He denies exertional chest pain prior to yesterday.  He does complain of some mild dyspnea with exertion.    Patient has not previously been treated for hypertension, dyslipidemia or diabetes.    Evaluation in the emergency room includesHigh-sensitivity troponin 7, 10.  BUN and creatinine 14 and 0.8 TSH 1.2 total cholesterol 219 HDL 23  triglycerides 522.  BUN and creatinine 14 and 0.8 hemoglobin 14.5 platelets 205 talk screen was negative CT of his head showed no acute intracranial abnormality there was evidence of sinusitis MRI of his brain showed no acute intracranial findings no acute infarct.  There is extensive paranasal sinus disease and bilateral mastoid air cell effusions.    EKG showed sinus bradycardia with a heart rate of 47.    Patient is on no negative chronotropic or AV allie blocking agents.    Patient was admitted for further evaluation.    Heart rate has ranged from 47 into the 60s.  Blood pressure has been stable.  Labs show the patient to be hyperglycemic.          Review of systems:    Constitutional: No weakness, fatigue, fever,  rigors, chills   Eyes: No vision changes, eye pain   ENT/oropharynx: No difficulty swallowing, sore throat, epistaxis, changes in hearing   Cardiovascular: No chest pain, chest tightness, palpitations, paroxysmal nocturnal dyspnea, orthopnea, diaphoresis, dizziness / syncopal episode   Respiratory: C/o shortness of breath, dyspnea on exertion, cough, wheezing, hemoptysis   Gastrointestinal: No abdominal pain, nausea, vomiting, diarrhea, bloody stools   Genitourinary: No hematuria, dysuria   Neurological: No headache, tremors, numbness, one-sided weakness    Musculoskeletal: No cramps, myalgias, joint pain, joint swelling   Integument: No rash, edema        Personal History:      Past Medical History:   Diagnosis Date    Asthma     Obesity     LAURA (obstructive sleep apnea)        History reviewed. No pertinent surgical history.    Family History   Problem Relation Age of Onset    Heart disease Mother        Social History     Tobacco Use    Smoking status: Never     Passive exposure: Past   Vaping Use    Vaping status: Never Used   Substance Use Topics    Alcohol use: Never    Drug use: Never        Home meds:  Prior to Admission medications    Medication Sig Start Date End Date Taking? Authorizing Provider   aspirin 81 MG EC tablet Take 1 tablet by mouth Daily.   Yes Luz Elena Nickerson MD   guaiFENesin (Mucinex) 600 MG 12 hr tablet Take 1 tablet by mouth Daily.   Yes Luz Elena Nickerson MD   loratadine (CLARITIN) 10 MG tablet Take 1 tablet by mouth Daily.   Yes Luz Elena Nickerson MD   montelukast (SINGULAIR) 10 MG tablet Take 1 tablet by mouth Daily.   Yes ProviderLuz Elena MD       Allergies:     Amoxicillin, Asa [aspirin], Penicillins, and Voltaren [diclofenac sodium]    Scheduled Meds:cetirizine, 10 mg, Oral, Daily  enoxaparin, 40 mg, Subcutaneous, Q24H  guaiFENesin, 600 mg, Oral, Daily  meclizine, 12.5 mg, Oral, Q8H  montelukast, 10 mg, Oral, Daily  sodium chloride, 10 mL, Intravenous,  "Q12H      Continuous Infusions:sodium chloride, 100 mL/hr, Last Rate: 100 mL/hr (04/23/24 2301)      PRN Meds:  acetaminophen    albuterol    senna-docusate sodium **AND** polyethylene glycol **AND** bisacodyl **AND** bisacodyl    nitroglycerin    ondansetron    sodium chloride    sodium chloride      OBJECTIVE    Vital Signs  Vitals:    04/24/24 0950 04/24/24 0954 04/24/24 1440 04/24/24 1626   BP: 143/95 (!) 159/109 142/100 125/82   BP Location: Right arm Right arm  Right arm   Patient Position: Sitting Standing  Lying   Pulse: 53 62 52 63   Resp: 17 14  16   Temp:    97.5 °F (36.4 °C)   TempSrc:    Oral   SpO2: 96%  94% 95%   Weight:       Height:           Flowsheet Rows      Flowsheet Row First Filed Value   Admission Height 172.7 cm (68\") Documented at 04/23/2024 1046   Admission Weight 113 kg (250 lb) Documented at 04/23/2024 1046              Intake/Output Summary (Last 24 hours) at 4/24/2024 1712  Last data filed at 4/24/2024 0614  Gross per 24 hour   Intake 240 ml   Output 500 ml   Net -260 ml        Telemetry:  Sinus Bradycardia    Physical Exam:  The patient is alert, oriented and in no distress.  Vital signs as noted above.  Head and neck revealed no carotid bruits or jugular venous distention.  No thyromegaly or lymphadenopathy is present  Lungs clear.  No wheezing.  Breath sounds are normal bilaterally.  Heart: Normal first and second heart sounds. No murmur.  No precordial rub is present.  No gallop is present.  Abdomen: Soft and nontender.  No organomegaly is present.  Extremities with good peripheral pulses without any pedal edema.  Skin: Warm and dry.  Musculoskeletal system is grossly normal.  CNS grossly normal.       Results Review:  I have personally reviewed the results from the time of this admission to 4/24/2024 17:12 EDT and agree with these findings:  [x]  Laboratory  []  Microbiology  []  Radiology  [x]  EKG/Telemetry   [x]  Cardiology/Vascular   []  Pathology  []  Old records  []  " Other:    Most notable findings include:     Lab Results (last 24 hours)       Procedure Component Value Units Date/Time    Hemoglobin A1c [[id number]]  (Abnormal) Collected: 04/24/24 0209    Specimen: Blood from Hand, Left Updated: 04/24/24 1130     Hemoglobin A1C 5.80 %     High Sensitivity Troponin T [[id number]]  (Normal) Collected: 04/24/24 0209    Specimen: Blood from Hand, Left Updated: 04/24/24 1052     HS Troponin T 10 ng/L     Narrative:      High Sensitive Troponin T Reference Range:  <14.0 ng/L- Negative Female for AMI  <22.0 ng/L- Negative Male for AMI  >=14 - Abnormal Female indicating possible myocardial injury.  >=22 - Abnormal Male indicating possible myocardial injury.   Clinicians would have to utilize clinical acumen, EKG, Troponin, and serial changes to determine if it is an Acute Myocardial Infarction or myocardial injury due to an underlying chronic condition.         Urine Drug Screen - Urine, Clean Catch [[id number]]  (Normal) Collected: 04/24/24 0807    Specimen: Urine, Clean Catch Updated: 04/24/24 0842     Amphet/Methamphet, Screen Negative     Barbiturates Screen, Urine Negative     Benzodiazepine Screen, Urine Negative     Cocaine Screen, Urine Negative     Opiate Screen Negative     THC, Screen, Urine Negative     Methadone Screen, Urine Negative     Oxycodone Screen, Urine Negative    Narrative:      Negative Thresholds Per Drugs Screened:    Amphetamines                 500 ng/ml  Barbiturates                 200 ng/ml  Benzodiazepines              100 ng/ml  Cocaine                      300 ng/ml  Methadone                    300 ng/ml  Opiates                      300 ng/ml  Oxycodone                    100 ng/ml  THC                           50 ng/ml    The Normal Value for all drugs tested is negative. This report includes final unconfirmed screening results to be used for medical treatment purposes only. Unconfirmed results must not be used for non-medical purposes such as  employment or legal testing. Clinical consideration should be applied to any drug of abuse test, particularly when unconfirmed results are used.          All urine drugs of abuse requests without chain of custody are for medical screening purposes only.  False positives are possible.      Basic Metabolic Panel [[id number]]  (Abnormal) Collected: 04/24/24 0209    Specimen: Blood from Hand, Left Updated: 04/24/24 0244     Glucose 117 mg/dL      BUN 14 mg/dL      Creatinine 0.86 mg/dL      Sodium 143 mmol/L      Potassium 4.1 mmol/L      Chloride 106 mmol/L      CO2 29.0 mmol/L      Calcium 9.0 mg/dL      BUN/Creatinine Ratio 16.3     Anion Gap 8.0 mmol/L      eGFR 97.3 mL/min/1.73     Narrative:      GFR Normal >60  Chronic Kidney Disease <60  Kidney Failure <15      Phosphorus [[id number]]  (Normal) Collected: 04/24/24 0209    Specimen: Blood from Hand, Left Updated: 04/24/24 0244     Phosphorus 2.7 mg/dL     Magnesium [[id number]]  (Normal) Collected: 04/24/24 0209    Specimen: Blood from Hand, Left Updated: 04/24/24 0244     Magnesium 2.2 mg/dL     Lipid Panel [[id number]]  (Abnormal) Collected: 04/24/24 0209    Specimen: Blood from Hand, Left Updated: 04/24/24 0244     Total Cholesterol 219 mg/dL      Triglycerides 522 mg/dL      HDL Cholesterol 23 mg/dL      LDL Cholesterol  106 mg/dL      VLDL Cholesterol 90 mg/dL      LDL/HDL Ratio 3.98    Narrative:      Cholesterol Reference Ranges  (U.S. Department of Health and Human Services ATP III Classifications)    Desirable          <200 mg/dL  Borderline High    200-239 mg/dL  High Risk          >240 mg/dL      Triglyceride Reference Ranges  (U.S. Department of Health and Human Services ATP III Classifications)    Normal           <150 mg/dL  Borderline High  150-199 mg/dL  High             200-499 mg/dL  Very High        >500 mg/dL    HDL Reference Ranges  (U.S. Department of Health and Human Services ATP III Classifications)    Low     <40 mg/dl (major risk factor for  CHD)  High    >60 mg/dl ('negative' risk factor for CHD)        LDL Reference Ranges  (U.S. Department of Health and Human Services ATP III Classifications)    Optimal          <100 mg/dL  Near Optimal     100-129 mg/dL  Borderline High  130-159 mg/dL  High             160-189 mg/dL  Very High        >189 mg/dL    TSH Rfx On Abnormal To Free T4 [[id number]]  (Normal) Collected: 04/24/24 0209    Specimen: Blood from Hand, Left Updated: 04/24/24 0242     TSH 1.270 uIU/mL     CBC Auto Differential [[id number]]  (Abnormal) Collected: 04/24/24 0209    Specimen: Blood from Hand, Left Updated: 04/24/24 0213     WBC 8.00 10*3/mm3      RBC 4.71 10*6/mm3      Hemoglobin 14.5 g/dL      Hematocrit 43.7 %      MCV 92.8 fL      MCH 30.8 pg      MCHC 33.2 g/dL      RDW 12.5 %      RDW-SD 43.0 fl      MPV 10.1 fL      Platelets 205 10*3/mm3      Neutrophil % 46.2 %      Lymphocyte % 40.8 %      Monocyte % 9.3 %      Eosinophil % 2.9 %      Basophil % 0.5 %      Immature Grans % 0.3 %      Neutrophils, Absolute 3.71 10*3/mm3      Lymphocytes, Absolute 3.26 10*3/mm3      Monocytes, Absolute 0.74 10*3/mm3      Eosinophils, Absolute 0.23 10*3/mm3      Basophils, Absolute 0.04 10*3/mm3      Immature Grans, Absolute 0.02 10*3/mm3      nRBC 0.0 /100 WBC     TSH [[id number]]  (Normal) Collected: 04/23/24 1047    Specimen: Blood Updated: 04/23/24 1800     TSH 0.541 uIU/mL             Imaging Results (Last 24 Hours)       Procedure Component Value Units Date/Time    MRI Brain Without Contrast [[id number]] Collected: 04/24/24 0736     Updated: 04/24/24 0742    Narrative:      MRI BRAIN WO CONTRAST    Date of Exam: 4/23/2024 10:00 PM EDT    Indication: Dizziness, non-specific.     Comparison: Head CT 4/23/2024    Technique:  Routine multiplanar/multisequence sequence images of the brain were obtained without contrast administration.      Findings:  No acute infarct. No intracranial hemorrhage. No intracranial mass. There are mild scattered  subcortical and periventricular white matter FLAIR/T2 hyperintensities which are nonspecific and can be seen in the setting of chronic small vessel ischemic   change. No extra-axial collections. The major intracranial vascular flow voids are grossly preserved and unremarkable in appearance. The cerebellopontine angles and midline structures are normal. Normal appearance of the orbits. There is extensive   paranasal sinus disease with mucosal thickening and/or fluid filling the frontal sinuses, ethmoid air cells, and sphenoid sinuses. Bilateral mastoid air cell effusions are noted. No acute or suspicious bony findings.      Impression:      Impression:  No acute intracranial findings. No acute infarct.    Extensive paranasal sinus disease.    Bilateral mastoid air cell effusions, nonspecific.        Electronically Signed: Markos York MD    4/24/2024 7:40 AM EDT    Workstation ID: [id number]            LAB RESULTS (LAST 7 DAYS)    CBC  Results from last 7 days   Lab Units 04/24/24  0209 04/23/24  1047   WBC 10*3/mm3 8.00 6.49   RBC 10*6/mm3 4.71 5.37   HEMOGLOBIN g/dL 14.5 16.3   HEMATOCRIT % 43.7 49.4   MCV fL 92.8 92.0   PLATELETS 10*3/mm3 205 234       BMP  Results from last 7 days   Lab Units 04/24/24  0209 04/23/24  1047   SODIUM mmol/L 143 138   POTASSIUM mmol/L 4.1 4.0   CHLORIDE mmol/L 106 103   CO2 mmol/L 29.0 22.8   BUN mg/dL 14 12   CREATININE mg/dL 0.86 0.71*   GLUCOSE mg/dL 117* 146*   MAGNESIUM mg/dL 2.2  --    PHOSPHORUS mg/dL 2.7  --        CMP   Results from last 7 days   Lab Units 04/24/24  0209 04/23/24  1047   SODIUM mmol/L 143 138   POTASSIUM mmol/L 4.1 4.0   CHLORIDE mmol/L 106 103   CO2 mmol/L 29.0 22.8   BUN mg/dL 14 12   CREATININE mg/dL 0.86 0.71*   GLUCOSE mg/dL 117* 146*       BNP        TROPONIN  Results from last 7 days   Lab Units 04/24/24  0209   HSTROP T ng/L 10       CoAg        Creatinine Clearance  Estimated Creatinine Clearance: 106.7 mL/min (by C-G formula based on SCr of 0.86  mg/dL).    ABG          Radiology  MRI Brain Without Contrast    Result Date: 4/24/2024  Impression: No acute intracranial findings. No acute infarct. Extensive paranasal sinus disease. Bilateral mastoid air cell effusions, nonspecific. Electronically Signed: Markos York MD  4/24/2024 7:40 AM EDT  Workstation ID: [id number]    CT Head Without Contrast    Result Date: 4/23/2024  Impression: Number No acute intracranial abnormality Sinusitis Electronically Signed: Jaskaran Landrum MD  4/23/2024 11:41 AM EDT  Workstation ID: [id number]       EKG  I personally viewed and interpreted the patient's EKG/Telemetry data:  ECG 12 Lead   ED Interpretation   Abnormal   Rashid Henderson MD     4/23/2024  1:07 PM   ECG 12 Lead         Date/Time: 4/23/2024 12:07 PM      Performed by: Rashid Henderson MD   Authorized by: Rashid Henderson MD  Interpreted by ED physician   Comparison: not compared with previous ECG    Rhythm: sinus bradycardia   Rate: bradycardic   BPM: 47   QRS axis: left   Conduction: conduction normal   ST Segments: ST segments normal   T Waves: T waves normal   Other: no other findings   Clinical impression: abnormal ECG      ECG 12 Lead Rhythm Change   Final Result   HEART RATE= 47  bpm   RR Interval= 1280  ms   PA Interval= 184  ms   P Horizontal Axis= 5  deg   P Front Axis= 26  deg   QRSD Interval= 99  ms   QT Interval= 432  ms   QTcB= 382  ms   QRS Axis= -42  deg   T Wave Axis=   deg   - OTHERWISE NORMAL ECG -   Sinus bradycardia   Left axis deviation   No previous ECG available for comparison   Electronically Signed By: Rashid Henderson ([facility]) 23-Apr-2024 10:55:19   Date and Time of Study: 2024-04-23 10:50:23                  Echocardiogram:    Results for orders placed during the hospital encounter of 04/23/24    Adult Transthoracic Echo Complete w/ Color, Spectral and Contrast if necessary per protocol    Interpretation Summary  Technically difficult study.  Patient is in sinus bradycardia throughout the  study.    Normal left and right ventricular size and systolic function.  Left ventricular concentric hypertrophy.  Normal left ventricular diastolic function.  No significant valve disease noted.  Aortic root at sinus of Valsalva measuring 4 cm.        Stress Test:  Results for orders placed during the hospital encounter of 04/23/24    Stress Test With Myocardial Perfusion One Day    Interpretation Summary    Myocardial perfusion imaging indicates a normal myocardial perfusion study with no evidence of ischemia. Impressions are consistent with a low risk study.    Left ventricular ejection fraction is normal (Calculated EF = 61%).    Diaphragmatic attenuation artifact is present.    This is Cardiolite imaging stress test.  Patient had fixed apical defect which showed normal thickening and contractibility probably consistent with attenuation artifact.  Left ventricular size and function was normal.  No ischemia noted.  Clinical correlation recommended.    Findings consistent with a normal ECG stress test.        Cardiac Catheterization:  No results found for this or any previous visit.        Other:      ASSESSMENT & PLAN:    Principal Problem:    Bradycardia  Active Problems:    Obesity (BMI 30-39.9)    Asthma    LAURA (obstructive sleep apnea)    Vertigo    Dizziness/loss of balance    MRI and head CT are unremarkable with the exception of sinusitis and paranasal sinus disease    Sinus bradycardia  No previous EKGs for comparison  No significant pauses  Blood pressure is stable    Hyperglycemia  No previous diagnosis of diabetes  We will check hemoglobin A1c    Dyslipidemia with hypertriglyceridemia  Consider statin    LAURA  Not always compliant with CPAP due to frequent traveling      Echocardiogram will be obtained, proceed with Lexiscan Myoview to rule out ischemic burden.  Patient does not feel like he would be able to walk on the treadmill at this time.    Consider outpatient M cot monitoring.    Will check  hemoglobin A1c    Consider starting statin for lipid management.    Additional recommendations per Dr. Almeida    Patient is seen and examined and findings are verified.  All data is reviewed by me personally.  Assessment and plan formulated by APC was done after discussion with attending.  I spent more than 50% of time in taking care of the patient.    Patient is presented with dizziness and lightheadedness.  Patient is noted to be bradycardic.    Normal S1 and S2.  No pericardial rub or murmur abdominal exam is benign    MDM:    1.  Dizziness and lightheadedness.    Patient is having symptom of dizziness and lightheadedness and it is most likely due to vertigo.  I will start meclizine but in the meanwhile his heart rate is low I would recommend that patient should be monitored as a outpatient.  Patient is not on any negative chronotropic agents.  Proceed with stress test and echocardiogram    2.  Sinus bradycardia:    Proceed with MCOT on discharge.  Avoid negative chronotropic agent    3.  Obstructive sleep apnea:    Patient is advised to be compliant with his CPAP.    4.  Disposition:    If these tests are negative patient can be discharged and follow-up with me as an outpatient    Electronically signed by Lew Almeida MD, 04/24/24, 5:12 PM EDT.        Lew Almeida MD  04/24/24  17:12 EDT

## 2024-04-24 NOTE — PROGRESS NOTES
[facility] MEDICINE SERVICE  DAILY PROGRESS NOTE    NAME: Chadwick Lal  : 1960  MRN: [id number]      LOS: 1 day     PROVIDER OF SERVICE: Efren Posada MD    Chief Complaint: Bradycardia    Subjective:     Interval History: Patient feels better today, he did have some mild dizziness last night but otherwise feels well this morning.  Upon arrival to the room, the patient was sleeping with his heart rate in the high 40s, however when he woke up his heart rate did improved to the 60s.    Review of Systems:   Review of Systems    Objective:     Vital Signs  Temp:  [97.4 °F (36.3 °C)-98.2 °F (36.8 °C)] 97.4 °F (36.3 °C)  Heart Rate:  [51-81] 62  Resp:  [14-21] 14  BP: (113-159)/() 159/109   Body mass index is 37.48 kg/m².    Physical Exam  Physical Exam  General Appearance:  Alert, cooperative, no distress, appears stated age  Head:  Normocephalic, without obvious abnormality, atraumatic  Eyes:  PERRL, conjunctiva/corneas clear, EOM's intact, fundi benign, both eyes  Ears:  Normal TM's and external ear canals, both ears  Nose: Nares normal, septum midline, mucosa normal, no drainage or sinus tenderness  Throat: Lips, mucosa, and tongue normal; teeth and gums normal  Neck: Supple, symmetrical, trachea midline, no adenopathy, thyroid: not enlarged, symmetric, no tenderness/mass/nodules, no carotid bruit or JVD  Lungs:   Clear to auscultation bilaterally, respirations unlabored  Heart:  Regular rate and rhythm, S1, S2 normal, no murmur, rub or gallop  Abdomen:  Soft, non-tender, bowel sounds active all four quadrants,  no masses, no organomegaly  Extremities: Extremities normal, atraumatic, no cyanosis or edema  Pulses: 2+ and symmetric  Skin: Skin color, texture, turgor normal, no rashes or lesions  Neurologic: Normal      Scheduled Meds   cetirizine, 10 mg, Oral, Daily  enoxaparin, 40 mg, Subcutaneous, Q24H  guaiFENesin, 600 mg, Oral, Daily  meclizine, 12.5 mg, Oral, Q8H  montelukast, 10 mg, Oral,  Daily  sodium chloride, 10 mL, Intravenous, Q12H       PRN Meds     acetaminophen    albuterol    senna-docusate sodium **AND** polyethylene glycol **AND** bisacodyl **AND** bisacodyl    nitroglycerin    ondansetron    sodium chloride    sodium chloride   Infusions  sodium chloride, 100 mL/hr, Last Rate: 100 mL/hr (04/23/24 2301)          Diagnostic Data    Results from last 7 days   Lab Units 04/24/24  0209   WBC 10*3/mm3 8.00   HEMOGLOBIN g/dL 14.5   HEMATOCRIT % 43.7   PLATELETS 10*3/mm3 205   GLUCOSE mg/dL 117*   CREATININE mg/dL 0.86   BUN mg/dL 14   SODIUM mmol/L 143   POTASSIUM mmol/L 4.1   ANION GAP mmol/L 8.0       MRI Brain Without Contrast    Result Date: 4/24/2024  Impression: No acute intracranial findings. No acute infarct. Extensive paranasal sinus disease. Bilateral mastoid air cell effusions, nonspecific. Electronically Signed: Markos York MD  4/24/2024 7:40 AM EDT  Workstation ID: [id number]    CT Head Without Contrast    Result Date: 4/23/2024  Impression: Number No acute intracranial abnormality Sinusitis Electronically Signed: Jaskaran Landrum MD  4/23/2024 11:41 AM EDT  Workstation ID: [id number]       I reviewed the patient's new clinical results.    Assessment/Plan:     Active and Resolved Problems  Active Hospital Problems    Diagnosis  POA    **Bradycardia [R00.1]  Yes    Obesity (BMI 30-39.9) [E66.9]  Yes    Asthma [J45.909]  Yes    LAURA (obstructive sleep apnea) [G47.33]  Yes    Vertigo [R42]  Yes      Resolved Hospital Problems   No resolved problems to display.       Symptomatic sinus bradycardia  -Patient is not on any medications that would cause bradycardia  -When he is asleep, he does develop bradycardia, however when he is awake his heart rate does improve  -Check echo today  -Plan for stress Myoview this morning; if heart rate does improve appropriately with stress Myoview, then this is less likely to be SSS  -Most likely the patient's bradycardia is related to LAURA; he is not very  compliant with his CPAP machine at home due to constant travel and inability to bring his machine with him  -Patient may benefit from MCOT on discharge    LAURA  -As above, the patient is not very compliant with his CPAP as he travels almost all week and she states that the machine is too large to carry with him  -Case management to discuss with his O2 provider travel CPAP    Mild hyperglycemia  -A1c of 5.8 indicates borderline diabetes  -Educated patient on diet and exercise for improved glucose control  -No indication for antidiabetic medications at this time    Dyslipidemia with hypertriglyceridemia  -Patient would benefit from statin or fibrate initiation    Obesity  -Counseled patient on diet and exercise to improve weight loss and comorbid conditions    DVT prophylaxis:  Medical DVT prophylaxis orders are present.         Code status is   Code Status and Medical Interventions:   Ordered at: 04/23/24 3326     Code Status (Patient has no pulse and is not breathing):    CPR (Attempt to Resuscitate)     Medical Interventions (Patient has pulse or is breathing):    Full Support       Plan for disposition: Hopefully DC on 4/25    Time: 30 minutes    Signature: Electronically signed by Efren Posada MD, 04/24/24, 12:01 EDT.  [facility] Hospitalist Team

## 2024-04-24 NOTE — PLAN OF CARE
Goal Outcome Evaluation:  Plan of Care Reviewed With: patient        Progress: improving   Pt is a 65 y/o M admitted to [facility] on 24 with complaints of sudden onset lightheadedness, dizziness and diaphoresis. He is also complaining of room-spinning dizziness. Two EKGs performed   in the ED each showing bradycardia with a heart rate of 47bpm with no ischemic changes noted. CT Head (-), MRI Brain (-), and Stress Test (-). MD concerned about dizziness/lightheadedness likely related LAURA and noncompliance with CPAP. PT consulted to assist with diagnosis of dizziness. He is currently living at home alone in [facility] with two steps to enter. At baseline, he is IND with household mobility, community ambulation, and ADLs with no AD. Has three separate jobs as , , and personal [employer] business. This date, he is AAOx4 and lying supine in bed. He adamantly reports he never had any dizziness, but rather explains he had lightheadedness that led to near syncopal episode. He completes bed mobility SBA, transfers CGA, and amb 30' CGA>min A with no AD. Very little balance concerns when up ambulating this date, pt stating balance significantly improved since yesterday. During vestibular assessment, pt showed no oculomotor deficits, no VOR abnormalities, and orthostatic vitals WNL. He was (-) with positional testing for BPPV this date, ruling out vestibular involvement as the cause of his dizziness. However, worth noting that gait does seem impaired compared to his baseline. Should still be safe to d/c back home, but recommending OPPT services at d/c to improve balance. PT will follow.     Anticipated Discharge Disposition (PT): home with outpatient therapy services

## 2024-04-24 NOTE — DISCHARGE PLACEMENT REQUEST
"Chadwick Lal (64 y.o. Male)       Date of Birth   1960    Social Security Number       Address   [address] [place] [facility]    Home Phone   [phone]    MRN   [id number]       Jain   None    Marital Status                               Admission Date   24    Admission Type   Urgent    Admitting Provider   Lester Blanton MD    Attending Provider   Efren Posada MD    Department, Room/Bed   [facility],        Discharge Date       Discharge Disposition       Discharge Destination                                 Attending Provider: Efren Posada MD    Allergies: Amoxicillin, Asa [Aspirin], Penicillins, Voltaren [Diclofenac Sodium]    Isolation: None   Infection: None   Code Status: CPR    Ht: 172.7 cm (68\")   Wt: 112 kg (246 lb 7.6 oz)    Admission Cmt: None   Principal Problem: Bradycardia [R00.1]                   Active Insurance as of 2024       Primary Coverage       Payor Plan Insurance Group Employer/Plan Group    ANTHEM BLUE CROSS ANTHEM BLUE CROSS BLUE [facility] PPO [id number]       Payor Plan Address Payor Plan Phone Number Payor Plan Fax Number Effective Dates    [address] [phone]  2021 - None Entered    [place] [address]         Subscriber Name Subscriber Birth Date Member ID       CHADWICK LAL 1960 [id number]                     Emergency Contacts        (Rel.) Home Phone Work Phone Mobile Phone    shaun farrell (Relative) [phone] -- --    angelita neumann (Sister) [phone] -- --                 History & Physical        Essing-Ju Bell APRN at 24 192       Attestation signed by Onofre Mares DO at 24 0328    I have reviewed this documentation and agree.      Electronically signed by nOofre Mares DO, 24, 3:28 AM EDT.                      [facility] Medicine Services  History & Physical    Patient Name: Chadwick Lal  : 1960  MRN: " "[id number]  Primary Care Physician:  Hiram Mack  Date of admission: 4/23/2024  Date and Time of Service: 4/23/2024 at 1925    Subjective      Chief Complaint: vertigo    History of Present Illness: Chadwick Lal is a 64 y.o. male with a CMH of obesity BMI 37.54, controlled asthma, obstructive sleep apnea reports uses CPAP intermittently due to work schedule who presented to [facility] emergency department, on 4/23/2024 with complaints of sudden onset of lightheadedness dizziness and diaphoresis.  He denies any chest pain jaw arm or back pain or shortness of breath.  He reports he feels nauseous but denied vomiting.  He describes the dizziness as \"room spinning\".  Denies any fevers chills cough or congestion.  He reports he has some mild sinus congestion but that this is chronic.  He reports he has never had a cardiac evaluation.  He reports his mother had heart disease.  2 EKGs were performed in the emergency department each showing bradycardia with a heart rate of 47 no ischemic changes noted.  CT head per radiology showed no acute intracranial abnormality but did show sinusitis.  WBC is not elevated and he is afebrile he denies any nasal congestion or rhinorrhea.  Denies any visual changes or headache.  Labs today showed a troponin of 7, creatinine 0.71 glucose 146 TSH 0.541.  He was given 1 L fluid bolus in the ED as well as 4 mg Zofran and 25 mg meclizine in the emergency department.  Cardiology was consulted.  2D echo ordered in a.m., MRI brain ordered and pending.  He will be admitted for further evaluation and treatment.  Review of Systems   Constitutional: Negative.    HENT:  Positive for sinus pressure.    Eyes: Negative.    Respiratory: Negative.     Cardiovascular: Negative.    Gastrointestinal: Negative.    Endocrine: Negative.    Genitourinary: Negative.    Musculoskeletal: Negative.    Skin: Negative.    Allergic/Immunologic: Negative.    Neurological:  Positive for dizziness " and light-headedness.   Hematological: Negative.    Psychiatric/Behavioral: Negative.     All other systems reviewed and are negative.      Personal History     Past Medical History:   Diagnosis Date    Asthma     Obesity     LAURA (obstructive sleep apnea)        History reviewed. No pertinent surgical history.    Family History: family history includes Heart disease in his mother. Otherwise pertinent FHx was reviewed and not pertinent to current issue.    Social History:  reports that he has never smoked. He has been exposed to tobacco smoke. He does not have any smokeless tobacco history on file. He reports that he does not drink alcohol and does not use drugs.    Home Medications:  Prior to Admission Medications       Prescriptions Last Dose Informant Patient Reported? Taking?    aspirin 81 MG EC tablet 4/23/2024  Yes Yes    Take 1 tablet by mouth Daily.    guaiFENesin (Mucinex) 600 MG 12 hr tablet 4/23/2024  Yes Yes    Take 1 tablet by mouth Daily.    loratadine (CLARITIN) 10 MG tablet 4/23/2024  Yes Yes    Take 1 tablet by mouth Daily.    montelukast (SINGULAIR) 10 MG tablet 4/23/2024  Yes Yes    Take 1 tablet by mouth Daily.              Allergies:  Allergies   Allergen Reactions    Amoxicillin Anaphylaxis    Asa [Aspirin] Anaphylaxis     Per patient; he can only tolerate 81mg. Any higher dose he can go into anaphylaxis per [facility]     Penicillins Anaphylaxis    Voltaren [Diclofenac Sodium] Anaphylaxis       Objective      Vitals:   Temp:  [97.5 °F (36.4 °C)] 97.5 °F (36.4 °C)  Heart Rate:  [48-81] 62  Resp:  [16-18] 16  BP: (121-152)/(86-96) 136/92  Body mass index is 37.54 kg/m².  Physical Exam  Vitals reviewed.   Constitutional:       Appearance: Normal appearance. He is obese.   HENT:      Head: Normocephalic and atraumatic.      Right Ear: External ear normal.      Left Ear: External ear normal.      Nose: Nose normal.      Mouth/Throat:      Mouth: Mucous membranes are moist.   Eyes:       Extraocular Movements: Extraocular movements intact.   Cardiovascular:      Rate and Rhythm: Normal rate and regular rhythm.      Pulses: Normal pulses.      Heart sounds: Normal heart sounds.   Pulmonary:      Effort: Pulmonary effort is normal.      Breath sounds: Normal breath sounds.   Abdominal:      Palpations: Abdomen is soft.   Genitourinary:     Comments: deferred  Musculoskeletal:         General: Normal range of motion.      Cervical back: Normal range of motion and neck supple.   Skin:     General: Skin is warm and dry.   Neurological:      General: No focal deficit present.      Mental Status: He is alert and oriented to person, place, and time.   Psychiatric:         Mood and Affect: Mood normal.         Behavior: Behavior normal.         Thought Content: Thought content normal.         Judgment: Judgment normal.         Diagnostic Data:  Lab Results (last 24 hours)       Procedure Component Value Units Date/Time    TSH [[id number]]  (Normal) Collected: 04/23/24 1047    Specimen: Blood Updated: 04/23/24 1800     TSH 0.541 uIU/mL     Basic Metabolic Panel [[id number]]  (Abnormal) Collected: 04/23/24 1047    Specimen: Blood Updated: 04/23/24 1114     Glucose 146 mg/dL      BUN 12 mg/dL      Creatinine 0.71 mg/dL      Sodium 138 mmol/L      Potassium 4.0 mmol/L      Chloride 103 mmol/L      CO2 22.8 mmol/L      Calcium 9.3 mg/dL      BUN/Creatinine Ratio 16.9     Anion Gap 12.2 mmol/L      eGFR 103.1 mL/min/1.73     Narrative:      GFR Normal >60  Chronic Kidney Disease <60  Kidney Failure <15      Single High Sensitivity Troponin T [[id number]]  (Normal) Collected: 04/23/24 1047    Specimen: Blood Updated: 04/23/24 1114     HS Troponin T 7 ng/L     Narrative:      High Sensitive Troponin T Reference Range:  <14.0 ng/L- Negative Female for AMI  <22.0 ng/L- Negative Male for AMI  >=14 - Abnormal Female indicating possible myocardial injury.  >=22 - Abnormal Male indicating possible myocardial injury.    Clinicians would have to utilize clinical acumen, EKG, Troponin, and serial changes to determine if it is an Acute Myocardial Infarction or myocardial injury due to an underlying chronic condition.         CBC & Differential [[id number]]  (Normal) Collected: 04/23/24 1047    Specimen: Blood Updated: 04/23/24 1056    Narrative:      The following orders were created for panel order CBC & Differential.  Procedure                               Abnormality         Status                     ---------                               -----------         ------                     CBC Auto Differential[[id number]]        Normal              Final result                 Please view results for these tests on the individual orders.    CBC Auto Differential [[id number]]  (Normal) Collected: 04/23/24 1047    Specimen: Blood Updated: 04/23/24 1056     WBC 6.49 10*3/mm3      RBC 5.37 10*6/mm3      Hemoglobin 16.3 g/dL      Hematocrit 49.4 %      MCV 92.0 fL      MCH 30.4 pg      MCHC 33.0 g/dL      RDW 12.5 %      RDW-SD 42.9 fl      MPV 9.9 fL      Platelets 234 10*3/mm3      Neutrophil % 53.9 %      Lymphocyte % 34.1 %      Monocyte % 8.0 %      Eosinophil % 3.2 %      Basophil % 0.6 %      Immature Grans % 0.2 %      Neutrophils, Absolute 3.50 10*3/mm3      Lymphocytes, Absolute 2.21 10*3/mm3      Monocytes, Absolute 0.52 10*3/mm3      Eosinophils, Absolute 0.21 10*3/mm3      Basophils, Absolute 0.04 10*3/mm3      Immature Grans, Absolute 0.01 10*3/mm3     POC Glucose Once [[id number]]  (Abnormal) Collected: 04/23/24 1045    Specimen: Blood Updated: 04/23/24 1047     Glucose 156 mg/dL      Comment: Serial Number: [id number]:  [id number]                Imaging Results (Last 24 Hours)       Procedure Component Value Units Date/Time    CT Head Without Contrast [[id number]] Collected: 04/23/24 1138     Updated: 04/23/24 1143    Narrative:      CT HEAD WO CONTRAST    Date of Exam: 4/23/2024 11:30 AM EDT    Indication: Dizziness,  non-specific.    Comparison: None available.    Technique: Axial CT images were obtained of the head without contrast administration.  Coronal reconstructions were performed.  Automated exposure control and iterative reconstruction methods were used.      Findings:  There is no evidence of hemorrhage. There is no mass effect or midline shift.    There is no extracerebral collection.    Ventricles are normal in size and configuration for patient's stated age.     Posterior fossa is within normal limits.    Calvarium and skull base appear intact.  There is almost complete opacification of frontal, sphenoid and ethmoid sinuses. Maxillary sinuses are partially outside of the field-of-view. There appears to be maxillary antrostomy changes      Impression:      Impression:  Number  No acute intracranial abnormality    Sinusitis      Electronically Signed: Jaskaran Landrum MD    4/23/2024 11:41 AM EDT    Workstation ID: [id number]              Assessment & Plan        This is a 64 y.o. male with:    Active and Resolved Problems  Active Hospital Problems    Diagnosis  POA    **Bradycardia [R00.1]  Yes     Priority: High    Vertigo [R42]  Yes     Priority: High    Obesity (BMI 30-39.9) [E66.9]  Yes    Asthma [J45.909]  Yes    LAURA (obstructive sleep apnea) [G47.33]  Yes      Resolved Hospital Problems   No resolved problems to display.       Sinus bradycardia, no ischemic changes seen on EKG, etiology undetermined, continuous cardiac monitoring, cardiology consulted, 2D echo in a.m., cardiology considering stress testing n.p.o. past midnight, normal saline at 100 cc/h    Vertigo, CT head negative per radiology except for sinusitis, WBC not elevated afebrile denies nasal congestion or rhinorrhea may be secondary to bradycardia see plan above, MRI brain ordered and pending, recently saw ENT for right otitis externa.  Fall precautions meclizine 25 mg every 8 hours    Obesity BMI 37.54 lifestyle management education lipid panel in  a.m.    Asthma controlled, continue use of inhalers for 6 months, albuterol INH as needed if needed on home Singulair, Claritin and Mucinex    Obstructive sleep apnea, intermittently compliant with CPAP, CPAP ordered here    DVT prophylaxis:  Medical DVT prophylaxis orders are present.        The patient desires to be as follows:    CODE STATUS:    Code Status (Patient has no pulse and is not breathing): CPR (Attempt to Resuscitate)  Medical Interventions (Patient has pulse or is breathing): Full Support            Admission Status:  I believe this patient meets inpatient  status.    Expected Length of Stay: pending evaluation results     PDMP and Medication Dispenses via Sidebar reviewed and consistent with patient reported medications.    I discussed the patient's findings and my recommendations with patient and family.      Signature:     This document has been electronically signed by KENNEDY Price on April 23, 2024 20:29 EDT   [facility] Hospitalist Team    Electronically signed by Onofre Mares DO at 04/24/24 0328

## 2024-04-24 NOTE — PLAN OF CARE
Goal Outcome Evaluation: Patient has not complained of dizziness or vertigo, stress test and echo complete, HR 60-70 when awake, drops to the 40's when sleeping all other vs stable, possible discharge tomorrow

## 2024-04-24 NOTE — CASE MANAGEMENT/SOCIAL WORK
Discharge Planning Assessment   Renny     Patient Name: Chadwick Lal  MRN: [id number]  Today's Date: 4/24/2024    Admit Date: 4/23/2024    Plan: Anticipate routine home alone. Current home cpap through [facility].   Discharge Needs Assessment       Row Name 04/24/24 1014       Living Environment    People in Home alone    Current Living Arrangements home    Potentially Unsafe Housing Conditions none    In the past 12 months has the electric, gas, oil, or water company threatened to shut off services in your home? No    Primary Care Provided by self    Provides Primary Care For no one    Family Caregiver if Needed other relative(s)  Relative present at bedside    Quality of Family Relationships supportive    Able to Return to Prior Arrangements yes       Resource/Environmental Concerns    Resource/Environmental Concerns none    Transportation Concerns none       Transportation Needs    In the past 12 months, has lack of transportation kept you from medical appointments or from getting medications? no    In the past 12 months, has lack of transportation kept you from meetings, work, or from getting things needed for daily living? No       Food Insecurity    Within the past 12 months, you worried that your food would run out before you got the money to buy more. Never true    Within the past 12 months, the food you bought just didn't last and you didn't have money to get more. Never true       Transition Planning    Patient/Family Anticipates Transition to home    Patient/Family Anticipated Services at Transition none    Transportation Anticipated car, drives self;family or friend will provide       Discharge Needs Assessment    Readmission Within the Last 30 Days no previous admission in last 30 days    Equipment Currently Used at Home cpap    Concerns to be Addressed care coordination/care conferences    Anticipated Changes Related to Illness none    Equipment Needed After Discharge cpap  Requests to have a smaller  cpap    Provided Post Acute Provider List? N/A                   Discharge Plan       Row Name 04/24/24 1015       Plan    Plan Anticipate routine home alone. Current home cpap through [facility].    Patient/Family in Agreement with Plan yes    Plan Comments CM met with patient at bedside to discuss dc planning. PCP and pharmacy confirmed, reported no trouble affording medications, and declined needs at this time for any DME/HH/PT services. Patient reported that he lives alone and he travels 6 days per week. Reported that his home cpap machine through [facility] is very large and he was interested in looking into getting a new, smaller machine. Reported that he has seen smaller machines for purchase online. Reported that he has concerns of packing his cpap because he does not want it to get damaged on the 30+ flights he is on weekly. Patient reported that he got his machine around a year ago and has not been very good about wearing it due to his travel schedule/size of machine. CM added in [facility] basket and contacted liaison Leigh to inquire about any smaller alternatives for home cpap.                  Continued Care and Services - Admitted Since 4/23/2024       Durable Medical Equipment       Service Provider Request Status Selected Services Address Phone Fax Patient Preferred    [facility] Pending - Request Sent N/A [id number], [place] [id number] [phone] --                  Expected Discharge Date and Time       Expected Discharge Date Expected Discharge Time    Apr 25, 2024            Demographic Summary       Row Name 04/24/24 1011       General Information    Admission Type inpatient    Arrived From emergency department    Referral Source admission list    Reason for Consult discharge planning    Preferred Language English       Contact Information    Permission Granted to Share Info With               Functional Status       Row Name 04/24/24 1015        Functional Status    Usual Activity Tolerance good    Current Activity Tolerance good       Functional Status, IADL    Medications independent    Meal Preparation independent    Housekeeping independent    Laundry independent    Shopping independent       Employment/    Employment Status employed full-time    Current or Previous Occupation traveling/driving           Lima Rodriguez RN     Office Phone: [phone]  Office Cell: [phone]

## 2024-04-24 NOTE — THERAPY EVALUATION
Patient Name: Chadwick Lal  : 1960    MRN: [id number]                              Today's Date: 2024       Admit Date: 2024    Visit Dx:     ICD-10-CM ICD-9-CM   1. Dizziness  R42 780.4   2. Near syncope  R55 780.2   3. Bradycardia  R00.1 427.89     Patient Active Problem List   Diagnosis    Bradycardia    Obesity (BMI 30-39.9)    Asthma    LAURA (obstructive sleep apnea)    Vertigo     Past Medical History:   Diagnosis Date    Asthma     Obesity     LAURA (obstructive sleep apnea)      History reviewed. No pertinent surgical history.   General Information       Row Name 24 1618          Physical Therapy Time and Intention    Document Type evaluation  -MB     Mode of Treatment physical therapy  -MB       Row Name 24 1618          General Information    Patient Profile Reviewed yes  -MB     Prior Level of Function independent:;all household mobility;community mobility;gait;transfer;ADL's;home management;driving;work;using stairs  -MB     Existing Precautions/Restrictions no known precautions/restrictions  -MB     Barriers to Rehab none identified  -MB       Row Name 24 1618          Living Environment    People in Home alone  -MB       Row Name 24 1618          Home Main Entrance    Number of Stairs, Main Entrance two  -MB     Stair Railings, Main Entrance none  -MB       Row Name 24 1618          Stairs Within Home, Primary    Number of Stairs, Within Home, Primary none  -MB       Row Name 24 1618          Cognition    Orientation Status (Cognition) oriented x 4  -MB       Row Name 24 1618          Safety Issues, Functional Mobility    Impairments Affecting Function (Mobility) balance;postural/trunk control  -MB               User Key  (r) = Recorded By, (t) = Taken By, (c) = Cosigned By      Initials Name Provider Type    Vijay Barfield PT Physical Therapist                   Mobility       Row Name 24 1620          Bed Mobility    Bed Mobility bed  mobility (all) activities  -MB     All Activities, Porter (Bed Mobility) standby assist  -MB       Row Name 04/24/24 1620          Sit-Stand Transfer    Sit-Stand Porter (Transfers) contact guard  -MB     Comment, (Sit-Stand Transfer) no AD  -MB       Row Name 04/24/24 1620          Gait/Stairs (Locomotion)    Porter Level (Gait) contact guard  -MB     Patient was able to Ambulate yes  -MB     Distance in Feet (Gait) 30  -MB     Deviations/Abnormal Patterns (Gait) gait speed decreased;stride length decreased;weight shifting decreased  -MB     Bilateral Gait Deviations weight shift ability decreased  -MB     Comment, (Gait/Stairs) 30' CGA with no AD  -MB               User Key  (r) = Recorded By, (t) = Taken By, (c) = Cosigned By      Initials Name Provider Type    Vijay Barfield PT Physical Therapist                   Obj/Interventions       Row Name 04/24/24 1622          Range of Motion Comprehensive    General Range of Motion no range of motion deficits identified  -MB       Row Name 04/24/24 1622          Strength Comprehensive (MMT)    Comment, General Manual Muscle Testing (MMT) Assessment BLE Strength 4+/5 grossly  -MB       Row Name 04/24/24 1622          Balance    Balance Assessment sitting static balance;sitting dynamic balance;sit to stand dynamic balance;standing static balance;standing dynamic balance  -MB     Static Sitting Balance independent  -MB     Dynamic Sitting Balance independent  -MB     Position, Sitting Balance unsupported;sitting edge of bed  -MB     Sit to Stand Dynamic Balance contact guard  -MB     Static Standing Balance contact guard  -MB     Dynamic Standing Balance minimal assist  -MB       Row Name 04/24/24 1622          Sensory Assessment (Somatosensory)    Sensory Assessment (Somatosensory) sensation intact  -MB               User Key  (r) = Recorded By, (t) = Taken By, (c) = Cosigned By      Initials Name Provider Type    Vijay Barfield PT Physical  Therapist                   Goals/Plan       Row Name 04/24/24 1625          Bed Mobility Goal 1 (PT)    Activity/Assistive Device (Bed Mobility Goal 1, PT) bed mobility activities, all  -MB     [place] Level/Cues Needed (Bed Mobility Goal 1, PT) independent  -MB     Time Frame (Bed Mobility Goal 1, PT) long term goal (LTG);2 weeks  -MB       Row Name 04/24/24 1625          Transfer Goal 1 (PT)    Activity/Assistive Device (Transfer Goal 1, PT) transfers, all  -MB     [place] Level/Cues Needed (Transfer Goal 1, PT) independent  -MB     Time Frame (Transfer Goal 1, PT) long term goal (LTG);2 weeks  -MB       Row Name 04/24/24 1625          Gait Training Goal 1 (PT)    Activity/Assistive Device (Gait Training Goal 1, PT) gait (walking locomotion)  -MB     [place] Level (Gait Training Goal 1, PT) standby assist  -MB     Distance (Gait Training Goal 1, PT) 200  -MB     Time Frame (Gait Training Goal 1, PT) long term goal (LTG);2 weeks  -MB       Row Name 04/24/24 1625          Stairs Goal 1 (PT)    Activity/Assistive Device (Stairs Goal 1, PT) stairs, all skills  -MB     [place] Level/Cues Needed (Stairs Goal 1, PT) standby assist  -MB     Number of Stairs (Stairs Goal 1, PT) 2  -MB     Time Frame (Stairs Goal 1, PT) long term goal (LTG);2 weeks  -MB       Row Name 04/24/24 1625          Therapy Assessment/Plan (PT)    Planned Therapy Interventions (PT) balance training;bed mobility training;gait training;home exercise program;neuromuscular re-education;patient/family education;strengthening;transfer training;stair training  -MB               User Key  (r) = Recorded By, (t) = Taken By, (c) = Cosigned By      Initials Name Provider Type    Vijay Barfield, PT Physical Therapist                   Clinical Impression       Row Name 04/24/24 1622          Pain    Pretreatment Pain Rating 0/10 - no pain  -MB     Posttreatment Pain Rating 0/10 - no pain  -MB       Row Name 04/24/24 1638 04/24/24 1622        Plan of Care Review    Plan of Care Reviewed With -- patient  -MB    Progress -- improving  -MB    Outcome Evaluation Pt is a 63 y/o M admitted to [facility] on 24 with complaints of sudden onset lightheadedness, dizziness and diaphoresis. He is also complaining of room-spinning dizziness. Two EKGs performed   in the ED each showing bradycardia with a heart rate of 47bpm with no ischemic changes noted. CT Head (-), MRI Brain (-), and Stress Test (-). MD concerned about dizziness/lightheadedness likely related LAURA and noncompliance with CPAP. PT consulted to assist with diagnosis of dizziness. He is currently living at home alone in [facility] with two steps to enter. At baseline, he is IND with household mobility, community ambulation, and ADLs with no AD. 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 This date, he is AAOx4 and lying supine in bed. He adamantly reports he never had any dizziness, but rather explains he had lightheadedness that led to near syncopal episode. He completes bed mobility SBA, transfers CGA, and amb 30' CGA>min A with no AD. Very little balance concerns when up ambulating this date, pt stating balance significantly improved since yesterday. During vestibular assessment, pt showed no oculomotor deficits, no VOR abnormalities, and orthostatic vitals WNL. He was (-) with positional testing for BPPV this date, ruling out vestibular involvement as the cause of his dizziness. However, worth noting that gait does seem impaired compared to his baseline. Should still be safe to d/c back home, but recommending OPPT services at d/c to improve balance. PT will follow.  -MB --      Row Name 24 8137          Therapy Assessment/Plan (PT)    Rehab Potential (PT) good, to achieve stated therapy goals  -MB     Criteria for Skilled Interventions Met (PT) yes;meets criteria  -MB     Therapy Frequency (PT) 5 times/wk  -MB     Predicted  Duration of Therapy Intervention (PT) until d/c  -MB       Row Name 04/24/24 1622          Vital Signs    Pre Systolic BP Rehab 142  -MB     Pre Treatment Diastolic BP 90  -MB     Intra Systolic BP Rehab 134  -MB     Intra Treatment Diastolic BP 85  -MB     Post Systolic BP Rehab 128  -MB     Post Treatment Diastolic BP 86  -MB     Pretreatment Heart Rate (beats/min) 55  -MB     Intratreatment Heart Rate (beats/min) 64  -MB     Posttreatment Heart Rate (beats/min) 58  -MB     Pre SpO2 (%) 96  -MB     O2 Delivery Pre Treatment room air  -MB     O2 Delivery Intra Treatment room air  -MB     Post SpO2 (%) 96  -MB     O2 Delivery Post Treatment room air  -MB     Pre Patient Position Supine  -MB     Intra Patient Position Standing  -MB     Post Patient Position Supine  -MB       Row Name 04/24/24 1622          Positioning and Restraints    Pre-Treatment Position in bed  -MB     Post Treatment Position bed  -MB     In Bed notified nsg;supine;call light within reach;encouraged to call for assist  -MB               User Key  (r) = Recorded By, (t) = Taken By, (c) = Cosigned By      Initials Name Provider Type    Vijay Barfield, PT Physical Therapist                   Outcome Measures       Row Name 04/24/24 1625 04/24/24 0800       How much help from another person do you currently need...    Turning from your back to your side while in flat bed without using bedrails? 4  -MB 4  -JO    Moving from lying on back to sitting on the side of a flat bed without bedrails? 4  -MB 4  -JO    Moving to and from a bed to a chair (including a wheelchair)? 3  -MB 4  -JO    Standing up from a chair using your arms (e.g., wheelchair, bedside chair)? 3  -MB 4  -JO    Climbing 3-5 steps with a railing? 3  -MB 3  -JO    To walk in hospital room? 3  -MB 3  -JO    AM-PAC 6 Clicks Score (PT) 20  -MB 22  -JO    Highest Level of Mobility Goal 6 --> Walk 10 steps or more  -MB 7 --> Walk 25 feet or more  -JO              User Key  (r) = Recorded  By, (t) = Taken By, (c) = Cosigned By      Initials Name Provider Type    Janeth Fletcher RN Registered Nurse    Vijay Barfield, PT Physical Therapist                                 Physical Therapy Education       Title: PT OT SLP Therapies (Done)       Topic: Physical Therapy (Done)       Point: Mobility training (Done)       Learning Progress Summary             Patient Acceptance, E,TB, VU by MB at 2024 1626                         Point: Body mechanics (Done)       Learning Progress Summary             Patient Acceptance, E,TB, VU by MB at 2024 1626                         Point: Precautions (Done)       Learning Progress Summary             Patient Acceptance, E,TB, VU by MB at 2024 1626                                         User Key       Initials Effective Dates Name Provider Type Discipline    MB 23 -  Vijay Galvez PT Physical Therapist PT                  PT Recommendation and Plan  Planned Therapy Interventions (PT): balance training, bed mobility training, gait training, home exercise program, neuromuscular re-education, patient/family education, strengthening, transfer training, stair training  Plan of Care Reviewed With: patient  Progress: improving  Outcome Evaluation: Pt is a 63 y/o M admitted to [facility] on 24 with complaints of sudden onset lightheadedness, dizziness and diaphoresis. He is also complaining of room-spinning dizziness. Two EKGs performed   in the ED each showing bradycardia with a heart rate of 47bpm with no ischemic changes noted. CT Head (-), MRI Brain (-), and Stress Test (-). MD concerned about dizziness/lightheadedness likely related LAURA and noncompliance with CPAP. PT consulted to assist with diagnosis of dizziness. He is currently living at home alone in [facility] with two steps to enter. At baseline, he is IND with household mobility, community ambulation, and ADLs with no AD. 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 This date, he is AAOx4 and lying supine in bed. He adamantly reports he never had any dizziness, but rather explains he had lightheadedness that led to near syncopal episode. He completes bed mobility SBA, transfers CGA, and amb 30' CGA>min A with no AD. Very little balance concerns when up ambulating this date, pt stating balance significantly improved since yesterday. During vestibular assessment, pt showed no oculomotor deficits, no VOR abnormalities, and orthostatic vitals WNL. He was (-) with positional testing for BPPV this date, ruling out vestibular involvement as the cause of his dizziness. However, worth noting that gait does seem impaired compared to his baseline. Should still be safe to d/c back home, but recommending OPPT services at d/c to improve balance. PT will follow.     Time Calculation:   PT Evaluation Complexity  History, PT Evaluation Complexity: 1-2 personal factors and/or comorbidities  Examination of Body Systems (PT Eval Complexity): total of 3 or more elements  Clinical Presentation (PT Evaluation Complexity): evolving  Clinical Decision Making (PT Evaluation Complexity): moderate complexity  Overall Complexity (PT Evaluation Complexity): moderate complexity     PT Charges       Row Name 04/24/24 1627             Time Calculation    Start Time 1538  -MB      Stop Time 1605  -MB      Time Calculation (min) 27 min  -MB      PT Received On 04/24/24  -MB      PT - Next Appointment 04/25/24  -MB      PT Goal Re-Cert Due Date 05/08/24  -MB         Time Calculation- PT    Total Timed Code Minutes- PT 0 minute(s)  -MB                User Key  (r) = Recorded By, (t) = Taken By, (c) = Cosigned By      Initials Name Provider Type    Vijay Barfield, PT Physical Therapist                  Therapy Charges for Today       Code Description Service Date Service Provider Modifiers Qty    25979678166 HC PT EVAL MOD COMPLEXITY 4 4/24/2024 Vijay Galvez, PT GP  1            PT G-Codes  AM-PAC 6 Clicks Score (PT): 20  PT Discharge Summary  Anticipated Discharge Disposition (PT): home with outpatient therapy services    Vijay Galvez, PT  4/24/2024

## 2024-04-24 NOTE — CASE MANAGEMENT/SOCIAL WORK
Continued Stay Note  GUSTAVO Lawson     Patient Name: Chadwick Lal  MRN: [id number]  Today's Date: 4/24/2024    Admit Date: 4/23/2024    Plan: Anticipate routine home alone. Current home cpap through [facility].   Discharge Plan       Row Name 04/24/24 1218       Plan    Plan Comments Received update from cpap department at [facility] that insurance does not cover travel machines and [facility] does not carry, but they are available for purchase online at [employer]. Reported that patient would still need a prescription to purchase one online. Reported that the travel machines do not have a humidifier, so if he is used to that comfort, his current machine would probably be best suited for him.               Lima Rodriguez RN     Office Phone: [phone]  Office Cell: [phone]

## 2024-04-25 ENCOUNTER — APPOINTMENT (OUTPATIENT)
Dept: RESPIRATORY THERAPY | Facility: HOSPITAL | Age: 64
End: 2024-04-25
Payer: COMMERCIAL

## 2024-04-25 VITALS
OXYGEN SATURATION: 95 % | BODY MASS INDEX: 37.42 KG/M2 | DIASTOLIC BLOOD PRESSURE: 94 MMHG | WEIGHT: 246.91 LBS | TEMPERATURE: 97.8 F | HEIGHT: 68 IN | HEART RATE: 70 BPM | RESPIRATION RATE: 18 BRPM | SYSTOLIC BLOOD PRESSURE: 139 MMHG

## 2024-04-25 PROCEDURE — 99214 OFFICE O/P EST MOD 30 MIN: CPT | Performed by: INTERNAL MEDICINE

## 2024-04-25 PROCEDURE — 94761 N-INVAS EAR/PLS OXIMETRY MLT: CPT

## 2024-04-25 PROCEDURE — G0378 HOSPITAL OBSERVATION PER HR: HCPCS

## 2024-04-25 PROCEDURE — 94799 UNLISTED PULMONARY SVC/PX: CPT

## 2024-04-25 RX ORDER — MECLIZINE HYDROCHLORIDE 25 MG/1
25 TABLET ORAL 3 TIMES DAILY PRN
Qty: 30 TABLET | Refills: 0 | Status: SHIPPED | OUTPATIENT
Start: 2024-04-25

## 2024-04-25 RX ADMIN — GUAIFENESIN 600 MG: 600 TABLET, EXTENDED RELEASE ORAL at 08:51

## 2024-04-25 RX ADMIN — Medication 10 ML: at 08:52

## 2024-04-25 RX ADMIN — MECLIZINE HYDROCHLORIDE 12.5 MG: 25 TABLET ORAL at 06:04

## 2024-04-25 RX ADMIN — CETIRIZINE HYDROCHLORIDE 10 MG: 10 TABLET, FILM COATED ORAL at 08:51

## 2024-04-25 RX ADMIN — MONTELUKAST 10 MG: 10 TABLET, FILM COATED ORAL at 08:51

## 2024-04-25 NOTE — PROGRESS NOTES
LOS: 1 day   Admitting Physician- No att. providers found    Reason For Followup:    Bradycardia  Dizziness  Obstructive sleep apnea      Subjective     Patient is feeling better.  No complaints    Objective     Blood pressure is stable heart rate is in 60s    Review of Systems:   Review of Systems   Constitutional: Negative for chills and fever.   HENT:  Negative for ear discharge and nosebleeds.    Eyes:  Negative for discharge and redness.   Cardiovascular:  Negative for chest pain, orthopnea, palpitations, paroxysmal nocturnal dyspnea and syncope.   Respiratory:  Negative for cough, shortness of breath and wheezing.    Endocrine: Negative for heat intolerance.   Skin:  Negative for rash.   Musculoskeletal:  Negative for arthritis and myalgias.   Gastrointestinal:  Negative for abdominal pain, melena, nausea and vomiting.   Genitourinary:  Negative for dysuria and hematuria.   Neurological:  Negative for dizziness, light-headedness, numbness and tremors.   Psychiatric/Behavioral:  Negative for depression. The patient is not nervous/anxious.          Vital Signs  Vitals:    04/25/24 0543 04/25/24 0800 04/25/24 0945 04/25/24 1100   BP: 132/91  139/94    BP Location: Right arm  Right arm    Patient Position: Lying  Lying    Pulse: 73 (!) 48 60 70   Resp: 17 18 18    Temp: 97.3 °F (36.3 °C)  97.8 °F (36.6 °C)    TempSrc: Oral  Oral    SpO2: 97% 97% 96% 95%   Weight:       Height:         Wt Readings from Last 1 Encounters:   04/25/24 112 kg (246 lb 14.6 oz)       Intake/Output Summary (Last 24 hours) at 4/25/2024 1655  Last data filed at 4/25/2024 0945  Gross per 24 hour   Intake 720 ml   Output 1050 ml   Net -330 ml     Physical Exam:  Constitutional:       Appearance: Well-developed.   Eyes:      General: No scleral icterus.        Right eye: No discharge.   HENT:      Head: Normocephalic and atraumatic.   Neck:      Thyroid: No thyromegaly.      Lymphadenopathy: No cervical adenopathy.   Pulmonary:      Effort:  Pulmonary effort is normal. No respiratory distress.      Breath sounds: Normal breath sounds. No wheezing. No rales.   Cardiovascular:      Normal rate. Regular rhythm.      No gallop.    Edema:     Peripheral edema absent.   Abdominal:      Tenderness: There is no abdominal tenderness.   Skin:     Findings: No erythema or rash.   Neurological:      Mental Status: Alert and oriented to person, place, and time.         Results Review:   Lab Results (last 24 hours)       ** No results found for the last 24 hours. **          Imaging Results (Last 72 Hours)       Procedure Component Value Units Date/Time    MRI Brain Without Contrast [[id number]] Collected: 04/24/24 0736     Updated: 04/24/24 0742    Narrative:      MRI BRAIN WO CONTRAST    Date of Exam: 4/23/2024 10:00 PM EDT    Indication: Dizziness, non-specific.     Comparison: Head CT 4/23/2024    Technique:  Routine multiplanar/multisequence sequence images of the brain were obtained without contrast administration.      Findings:  No acute infarct. No intracranial hemorrhage. No intracranial mass. There are mild scattered subcortical and periventricular white matter FLAIR/T2 hyperintensities which are nonspecific and can be seen in the setting of chronic small vessel ischemic   change. No extra-axial collections. The major intracranial vascular flow voids are grossly preserved and unremarkable in appearance. The cerebellopontine angles and midline structures are normal. Normal appearance of the orbits. There is extensive   paranasal sinus disease with mucosal thickening and/or fluid filling the frontal sinuses, ethmoid air cells, and sphenoid sinuses. Bilateral mastoid air cell effusions are noted. No acute or suspicious bony findings.      Impression:      Impression:  No acute intracranial findings. No acute infarct.    Extensive paranasal sinus disease.    Bilateral mastoid air cell effusions, nonspecific.        Electronically Signed: Markos York MD     4/24/2024 7:40 AM EDT    Workstation ID: [id number]    CT Head Without Contrast [[id number]] Collected: 04/23/24 1138     Updated: 04/23/24 1143    Narrative:      CT HEAD WO CONTRAST    Date of Exam: 4/23/2024 11:30 AM EDT    Indication: Dizziness, non-specific.    Comparison: None available.    Technique: Axial CT images were obtained of the head without contrast administration.  Coronal reconstructions were performed.  Automated exposure control and iterative reconstruction methods were used.      Findings:  There is no evidence of hemorrhage. There is no mass effect or midline shift.    There is no extracerebral collection.    Ventricles are normal in size and configuration for patient's stated age.     Posterior fossa is within normal limits.    Calvarium and skull base appear intact.  There is almost complete opacification of frontal, sphenoid and ethmoid sinuses. Maxillary sinuses are partially outside of the field-of-view. There appears to be maxillary antrostomy changes      Impression:      Impression:  Number  No acute intracranial abnormality    Sinusitis      Electronically Signed: Jaskaran Landrum MD    4/23/2024 11:41 AM EDT    Workstation ID: [id number]          ECG/EMG Results (most recent)       Procedure Component Value Units Date/Time    ECG 12 Lead Rhythm Change [[id number]] Collected: 04/23/24 1050     Updated: 04/23/24 1055     QT Interval 432 ms      QTC Interval 382 ms     Narrative:      HEART RATE= 47  bpm  RR Interval= 1280  ms  RI Interval= 184  ms  P Horizontal Axis= 5  deg  P Front Axis= 26  deg  QRSD Interval= 99  ms  QT Interval= 432  ms  QTcB= 382  ms  QRS Axis= -42  deg  T Wave Axis=   deg  - OTHERWISE NORMAL ECG -  Sinus bradycardia  Left axis deviation  No previous ECG available for comparison  Electronically Signed By: Rashid Henderson ([facility]) 23-Apr-2024 10:55:19  Date and Time of Study: 2024-04-23 10:50:23    ECG 12 Lead [[id number]]  (Abnormal) Resulted: 04/23/24 1307     Updated:  04/23/24 1307    Adult Transthoracic Echo Complete w/ Color, Spectral and Contrast if necessary per protocol [[id number]] Resulted: 04/24/24 1234     Updated: 04/24/24 1239     EF(MOD-bp) 64.0 %      LVIDd 5.4 cm      LVIDs 3.9 cm      IVSd 1.10 cm      LVPWd 1.20 cm      FS 27.8 %      IVS/LVPW 0.92 cm      ESV(cubed) 59.3 ml      LV Sys Vol (BSA corrected) 14.9 cm2      EDV(cubed) 157.5 ml      LV Segura Vol (BSA corrected) 41.8 cm2      LV mass(C)d 249.4 grams      LVOT area 3.1 cm2      LVOT diam 2.00 cm      EDV(MOD-sp2) 84.2 ml      EDV(MOD-sp4) 93.2 ml      ESV(MOD-sp2) 30.4 ml      ESV(MOD-sp4) 33.2 ml      SV(MOD-sp2) 53.8 ml      SV(MOD-sp4) 60.0 ml      SVi(MOD-SP2) 24.1 ml/m2      SVi(MOD-SP4) 26.9 ml/m2      SVi (LVOT) 36.5 ml/m2      EF(MOD-sp2) 63.9 %      EF(MOD-sp4) 64.4 %      MV E max jason 88.0 cm/sec      MV A max jason 50.8 cm/sec      MV dec time 0.19 sec      MV E/A 1.73     Pulm A Revs Dur 0.17 sec      LA ESV Index (BP) 27.0 ml/m2      Med Peak E' Jason 9.6 cm/sec      Lat Peak E' Jason 13.2 cm/sec      TR max jason 151.0 cm/sec      Avg E/e' ratio 7.72     SV(LVOT) 81.4 ml      RVIDd 4.1 cm      RV Base 4.4 cm      RV Mid 3.3 cm      RV Length 7.8 cm      TAPSE (>1.6) 2.48 cm      RV S' 17.2 cm/sec      LA dimension (2D)  3.8 cm      Pulm Sys Jason 51.2 cm/sec      Pulm Segura Jason 58.6 cm/sec      Pulm S/D 0.87     Pulm A Revs Jason 26.2 cm/sec      LV V1 max 137.0 cm/sec      LV V1 max PG 7.5 mmHg      LV V1 mean PG 4.0 mmHg      LV V1 VTI 25.9 cm      Ao pk jason 147.0 cm/sec      Ao max PG 8.6 mmHg      Ao mean PG 5.0 mmHg      Ao V2 VTI 32.7 cm      CHEN(I,D) 2.49 cm2      MV max PG 2.9 mmHg      MV mean PG 1.00 mmHg      MV V2 VTI 31.6 cm      MV P1/2t 68.2 msec      MVA(P1/2t) 3.2 cm2      MVA(VTI) 2.6 cm2      MV dec slope 366.0 cm/sec2      TR max PG 9.1 mmHg      RVSP(TR) 24.1 mmHg      RAP systole 15.0 mmHg      RV V1 max PG 2.35 mmHg      RV V1 max 76.6 cm/sec      RV V1 VTI 15.3 cm      PA V2 max  88.5 cm/sec      PI end-d piter 51.3 cm/sec      Sinus 4.0 cm     Narrative:      Technically difficult study.  Patient is in sinus bradycardia throughout   the study.    Normal left and right ventricular size and systolic function.  Left ventricular concentric hypertrophy.  Normal left ventricular diastolic function.  No significant valve disease noted.  Aortic root at sinus of Valsalva measuring 4 cm.      Telemetry Scan [[id number]] Resulted: 04/23/24     Updated: 04/25/24 0640    Telemetry Scan [[id number]] Resulted: 04/23/24     Updated: 04/25/24 0906    Telemetry Scan [[id number]] Resulted: 04/23/24     Updated: 04/25/24 0948    Telemetry Scan [[id number]] Resulted: 04/23/24     Updated: 04/25/24 0950    Telemetry Scan [[id number]] Resulted: 04/23/24     Updated: 04/25/24 1045    Telemetry Scan [[id number]] Resulted: 04/23/24     Updated: 04/25/24 1049    Telemetry Scan [[id number]] Resulted: 04/23/24     Updated: 04/25/24 1109    Telemetry Scan [[id number]] Resulted: 04/23/24     Updated: 04/25/24 1148    Telemetry Scan [[id number]] Resulted: 04/23/24     Updated: 04/25/24 1207          CBC    Results from last 7 days   Lab Units 04/24/24  0209 04/23/24  1047   WBC 10*3/mm3 8.00 6.49   HEMOGLOBIN g/dL 14.5 16.3   PLATELETS 10*3/mm3 205 234     BMP   Results from last 7 days   Lab Units 04/24/24  0209 04/23/24  1047   SODIUM mmol/L 143 138   POTASSIUM mmol/L 4.1 4.0   CHLORIDE mmol/L 106 103   CO2 mmol/L 29.0 22.8   BUN mg/dL 14 12   CREATININE mg/dL 0.86 0.71*   GLUCOSE mg/dL 117* 146*   MAGNESIUM mg/dL 2.2  --    PHOSPHORUS mg/dL 2.7  --      CMP   Results from last 7 days   Lab Units 04/24/24  0209 04/23/24  1047   SODIUM mmol/L 143 138   POTASSIUM mmol/L 4.1 4.0   CHLORIDE mmol/L 106 103   CO2 mmol/L 29.0 22.8   BUN mg/dL 14 12   CREATININE mg/dL 0.86 0.71*   GLUCOSE mg/dL 117* 146*     Cardiac Studies:  Echo- Results for orders placed during the hospital encounter of 04/23/24    Adult Transthoracic Echo Complete w/  Color, Spectral and Contrast if necessary per protocol    Interpretation Summary  Technically difficult study.  Patient is in sinus bradycardia throughout the study.    Normal left and right ventricular size and systolic function.  Left ventricular concentric hypertrophy.  Normal left ventricular diastolic function.  No significant valve disease noted.  Aortic root at sinus of Valsalva measuring 4 cm.    Stress Myoview-  Cath-      Medication Review:   Scheduled Meds:  Continuous Infusions:No current facility-administered medications for this encounter.    PRN Meds:.      Assessment & Plan     Bradycardia    Obesity (BMI 30-39.9)    Asthma    LAURA (obstructive sleep apnea)    Vertigo    MDM:    1.  Dizziness and lightheadedness.    Patient symptoms are improving I suspect it is vertigo.  I would recommend patient be treated with Antivert for 1 to 2 weeks.  Patient should be followed up with ENT    2.  Sinus bradycardia:    Proceed with MCOT on discharge    3.  Obstructive sleep apnea:    Patient is on CPAP but patient is noncompliant.  Patient is advised to follow-up with sleep doctor and be compliant    Lew Almeida MD  04/25/24  16:55 EDT

## 2024-04-25 NOTE — PLAN OF CARE
Problem: Adult Inpatient Plan of Care  Goal: Plan of Care Review  Outcome: Ongoing, Progressing  Goal: Patient-Specific Goal (Individualized)  Outcome: Ongoing, Progressing  Goal: Absence of Hospital-Acquired Illness or Injury  Outcome: Ongoing, Progressing  Intervention: Identify and Manage Fall Risk  Recent Flowsheet Documentation  Taken 4/25/2024 0600 by Holli Dunham RN  Safety Promotion/Fall Prevention:   safety round/check completed   room organization consistent   nonskid shoes/slippers when out of bed   clutter free environment maintained   assistive device/personal items within reach   fall prevention program maintained  Taken 4/25/2024 0400 by Holli Dunham RN  Safety Promotion/Fall Prevention:   safety round/check completed   room organization consistent   nonskid shoes/slippers when out of bed   clutter free environment maintained   assistive device/personal items within reach   fall prevention program maintained  Taken 4/25/2024 0200 by Holli Dunham RN  Safety Promotion/Fall Prevention:   safety round/check completed   room organization consistent   nonskid shoes/slippers when out of bed   clutter free environment maintained   assistive device/personal items within reach   fall prevention program maintained  Taken 4/25/2024 0000 by Holli Dunham RN  Safety Promotion/Fall Prevention:   safety round/check completed   room organization consistent   nonskid shoes/slippers when out of bed   clutter free environment maintained   assistive device/personal items within reach   fall prevention program maintained  Taken 4/24/2024 2200 by Holli Dunham RN  Safety Promotion/Fall Prevention:   safety round/check completed   room organization consistent   nonskid shoes/slippers when out of bed   clutter free environment maintained   assistive device/personal items within reach   fall prevention program maintained  Taken 4/24/2024 2000 by Holli Dunham RN  Safety Promotion/Fall  Prevention:   safety round/check completed   room organization consistent   nonskid shoes/slippers when out of bed   clutter free environment maintained   assistive device/personal items within reach   fall prevention program maintained  Intervention: Prevent Skin Injury  Recent Flowsheet Documentation  Taken 4/25/2024 0600 by Holli Dunham RN  Body Position: position changed independently  Taken 4/25/2024 0400 by Holli Dunham RN  Body Position: position changed independently  Taken 4/25/2024 0200 by Holli Dunham RN  Body Position: position changed independently  Taken 4/25/2024 0000 by Holli Dunham RN  Body Position: position changed independently  Skin Protection: adhesive use limited  Taken 4/24/2024 2200 by Holli Dunham RN  Body Position: position changed independently  Taken 4/24/2024 2000 by Holli Dunham RN  Body Position: position changed independently  Skin Protection: adhesive use limited  Intervention: Prevent and Manage VTE (Venous Thromboembolism) Risk  Recent Flowsheet Documentation  Taken 4/25/2024 0400 by Holli Dunham RN  Range of Motion: active ROM (range of motion) encouraged  Taken 4/25/2024 0000 by Holli Dunham RN  Range of Motion: active ROM (range of motion) encouraged  Taken 4/24/2024 2000 by Holli Dunham RN  Range of Motion: active ROM (range of motion) encouraged  Intervention: Prevent Infection  Recent Flowsheet Documentation  Taken 4/25/2024 0600 by Holli Dunham RN  Infection Prevention:   environmental surveillance performed   equipment surfaces disinfected   hand hygiene promoted   rest/sleep promoted   single patient room provided  Taken 4/25/2024 0400 by Holli Dunham RN  Infection Prevention:   environmental surveillance performed   equipment surfaces disinfected   hand hygiene promoted   rest/sleep promoted   single patient room provided  Taken 4/25/2024 0200 by Holli Dunham RN  Infection Prevention:    environmental surveillance performed   equipment surfaces disinfected   hand hygiene promoted   rest/sleep promoted   single patient room provided  Taken 4/25/2024 0000 by Holli Dunham RN  Infection Prevention:   environmental surveillance performed   equipment surfaces disinfected   hand hygiene promoted   rest/sleep promoted   single patient room provided  Taken 4/24/2024 2200 by Holli Dunham RN  Infection Prevention:   environmental surveillance performed   equipment surfaces disinfected   hand hygiene promoted   rest/sleep promoted   single patient room provided  Taken 4/24/2024 2000 by Holli Dunham RN  Infection Prevention:   environmental surveillance performed   equipment surfaces disinfected   hand hygiene promoted   rest/sleep promoted   single patient room provided  Goal: Optimal Comfort and Wellbeing  Outcome: Ongoing, Progressing  Intervention: Provide Person-Centered Care  Recent Flowsheet Documentation  Taken 4/25/2024 0400 by Holli Dunham RN  Trust Relationship/Rapport:   care explained   choices provided  Taken 4/25/2024 0000 by Holli Dunham RN  Trust Relationship/Rapport:   care explained   choices provided  Taken 4/24/2024 2000 by Holli Dunham RN  Trust Relationship/Rapport:   care explained   choices provided  Goal: Readiness for Transition of Care  Outcome: Ongoing, Progressing     Problem: Fall Injury Risk  Goal: Absence of Fall and Fall-Related Injury  Outcome: Ongoing, Progressing  Intervention: Identify and Manage Contributors  Recent Flowsheet Documentation  Taken 4/24/2024 2000 by Holli Dunham RN  Medication Review/Management: medications reviewed  Intervention: Promote Injury-Free Environment  Recent Flowsheet Documentation  Taken 4/25/2024 0600 by Holli Dunham RN  Safety Promotion/Fall Prevention:   safety round/check completed   room organization consistent   nonskid shoes/slippers when out of bed   clutter free environment  maintained   assistive device/personal items within reach   fall prevention program maintained  Taken 4/25/2024 0400 by Holli Dunham RN  Safety Promotion/Fall Prevention:   safety round/check completed   room organization consistent   nonskid shoes/slippers when out of bed   clutter free environment maintained   assistive device/personal items within reach   fall prevention program maintained  Taken 4/25/2024 0200 by Holli Dunham RN  Safety Promotion/Fall Prevention:   safety round/check completed   room organization consistent   nonskid shoes/slippers when out of bed   clutter free environment maintained   assistive device/personal items within reach   fall prevention program maintained  Taken 4/25/2024 0000 by Holli Dunham RN  Safety Promotion/Fall Prevention:   safety round/check completed   room organization consistent   nonskid shoes/slippers when out of bed   clutter free environment maintained   assistive device/personal items within reach   fall prevention program maintained  Taken 4/24/2024 2200 by Holli Dunham RN  Safety Promotion/Fall Prevention:   safety round/check completed   room organization consistent   nonskid shoes/slippers when out of bed   clutter free environment maintained   assistive device/personal items within reach   fall prevention program maintained  Taken 4/24/2024 2000 by Holli Dunham RN  Safety Promotion/Fall Prevention:   safety round/check completed   room organization consistent   nonskid shoes/slippers when out of bed   clutter free environment maintained   assistive device/personal items within reach   fall prevention program maintained     Problem: Noninvasive Ventilation Acute  Goal: Effective Unassisted Ventilation and Oxygenation  Outcome: Ongoing, Progressing   Goal Outcome Evaluation:

## 2024-04-25 NOTE — DISCHARGE SUMMARY
[facility] Medicine Services  Discharge Summary    Date of Service: 2024  Patient Name: Chadwick Lal  : 1960  MRN: [id number]    Date of Admission: 2024  Discharge Diagnosis:   Symptomatic sinus bradycardia  LAURA  Mild hyperglycemia  Dyslipidemia with hypertriglyceridemia  Probable sinus vascular congestion  Obesity  Date of Discharge: 2024  Primary Care Physician: Souleymane Rosa MD      Presenting Problem:   Dizziness [R42]  Bradycardia [R00.1]  Near syncope [R55]    Active and Resolved Hospital Problems:  Active Hospital Problems    Diagnosis POA    **Bradycardia [R00.1] Yes    Obesity (BMI 30-39.9) [E66.9] Yes    Asthma [J45.909] Yes    LAURA (obstructive sleep apnea) [G47.33] Yes    Vertigo [R42] Yes      Resolved Hospital Problems   No resolved problems to display.         Hospital Course     HPI:  Patient is a 64-year-old male who presented to the hospital with complaints of dizziness and lightheadedness.  Please see H&P for details.    Hospital Course:  Patient was found to be in sinus bradycardia, although BP was stable.  This may have been causing the patient's symptoms.  He appeared to be more bradycardic while sleeping but with heart rate improved while awake.  He was seen by cardiology and underwent echocardiogram which showed normal EF and no other valvular abnormalities.  Stress Myoview also did not show any evidence of ischemic heart disease.  He had adequate heart rate response with stress Myoview.  Patient also has some sinus congestion issues during the spring season and this needs years.  Initially thought he might have BPPV, however PT states that he did not have any positive signs for BPPV during their exam.  Most likely he has sinus congestion causing his symptoms.  At advised him to use Flonase daily.  He will be [place] with [facility].  He will follow-up with cardiology in 1 month.  Of note, the patient does have LAURA although he states that he  does not travel with his CPAP machine as it is too bulky to travel, he is a  and is unable to carry a large machine without any risk of damage.  We attempted to contact his company but they state that he is unable to have insurance coverage for a portable machine given that he has a previous CPAP machine.  He will need to pay out-of-pocket for this.  I advised him to follow-up with his pulmonologist to obtain a prescription for a portable CPAP machine, which he will need to buy online.  He is stable for discharge.        DISCHARGE Follow Up Recommendations for labs and diagnostics: Follow-up with cardiology in 1 month.      Reasons For Change In Medications and Indications for New Medications:      Day of Discharge     Vital Signs:  Temp:  [97.3 °F (36.3 °C)-97.8 °F (36.6 °C)] 97.8 °F (36.6 °C)  Heart Rate:  [48-73] 70  Resp:  [16-18] 18  BP: (108-142)/() 139/94    Physical Exam:  Physical Exam   General Appearance:  Alert, cooperative, no distress, appears stated age  Head:  Normocephalic, without obvious abnormality, atraumatic  Eyes:  PERRL, conjunctiva/corneas clear, EOM's intact, fundi benign, both eyes  Ears:  Normal TM's and external ear canals, both ears  Nose: Nares normal, septum midline, mucosa normal, no drainage or sinus tenderness  Throat: Lips, mucosa, and tongue normal; teeth and gums normal  Neck: Supple, symmetrical, trachea midline, no adenopathy, thyroid: not enlarged, symmetric, no tenderness/mass/nodules, no carotid bruit or JVD  Lungs:   Clear to auscultation bilaterally, respirations unlabored  Heart:  Regular rate and rhythm, S1, S2 normal, no murmur, rub or gallop  Abdomen:  Soft, non-tender, bowel sounds active all four quadrants,  no masses, no organomegaly  Extremities: Extremities normal, atraumatic, no cyanosis or edema  Pulses: 2+ and symmetric  Skin: Skin color, texture, turgor normal, no rashes or lesions  Neurologic: Normal        Pertinent  and/or Most Recent  Results     LAB RESULTS:      Lab 04/24/24  0209 04/23/24  1047   WBC 8.00 6.49   HEMOGLOBIN 14.5 16.3   HEMATOCRIT 43.7 49.4   PLATELETS 205 234   NEUTROS ABS 3.71 3.50   IMMATURE GRANS (ABS) 0.02 0.01   LYMPHS ABS 3.26* 2.21   MONOS ABS 0.74 0.52   EOS ABS 0.23 0.21   MCV 92.8 92.0         Lab 04/24/24  0209 04/23/24  1047   SODIUM 143 138   POTASSIUM 4.1 4.0   CHLORIDE 106 103   CO2 29.0 22.8   ANION GAP 8.0 12.2   BUN 14 12   CREATININE 0.86 0.71*   EGFR 97.3 103.1   GLUCOSE 117* 146*   CALCIUM 9.0 9.3   MAGNESIUM 2.2  --    PHOSPHORUS 2.7  --    HEMOGLOBIN A1C 5.80*  --    TSH 1.270 0.541             Lab 04/24/24  0209 04/23/24  1047   HSTROP T 10 7         Lab 04/24/24  0209   CHOLESTEROL 219*   LDL CHOL 106*   HDL CHOL 23*   TRIGLYCERIDES 522*             Brief Urine Lab Results       None          Microbiology Results (last 10 days)       ** No results found for the last 240 hours. **            MRI Brain Without Contrast    Result Date: 4/24/2024  Impression: Impression: No acute intracranial findings. No acute infarct. Extensive paranasal sinus disease. Bilateral mastoid air cell effusions, nonspecific. Electronically Signed: Markos York MD  4/24/2024 7:40 AM EDT  Workstation ID: [id number]    CT Head Without Contrast    Result Date: 4/23/2024  Impression: Impression: Number No acute intracranial abnormality Sinusitis Electronically Signed: Jaskaran Landrum MD  4/23/2024 11:41 AM EDT  Workstation ID: [id number]             Results for orders placed during the hospital encounter of 04/23/24    Adult Transthoracic Echo Complete w/ Color, Spectral and Contrast if necessary per protocol    Interpretation Summary  Technically difficult study.  Patient is in sinus bradycardia throughout the study.    Normal left and right ventricular size and systolic function.  Left ventricular concentric hypertrophy.  Normal left ventricular diastolic function.  No significant valve disease noted.  Aortic root at sinus of  Valsalva measuring 4 cm.      Labs Pending at Discharge:      Procedures Performed           Consults:   Consults       Date and Time Order Name Status Description    4/23/2024  4:46 PM Inpatient Cardiology Consult Completed               Discharge Details        Discharge Medications        New Medications        Instructions Start Date   meclizine 25 MG tablet  Commonly known as: ANTIVERT   25 mg, Oral, 3 Times Daily PRN             Continue These Medications        Instructions Start Date   aspirin 81 MG EC tablet   81 mg, Oral, Daily      loratadine 10 MG tablet  Commonly known as: CLARITIN   10 mg, Oral, Daily      montelukast 10 MG tablet  Commonly known as: SINGULAIR   10 mg, Oral, Daily      Mucinex 600 MG 12 hr tablet  Generic drug: guaiFENesin   600 mg, Oral, Daily               Allergies   Allergen Reactions    Amoxicillin Anaphylaxis    Asa [Aspirin] Anaphylaxis     Per patient; he can only tolerate 81mg. Any higher dose he can go into anaphylaxis per [facility]     Penicillins Anaphylaxis    Voltaren [Diclofenac Sodium] Anaphylaxis         Discharge Disposition:     Home or Self Care    Diet:  Hospital:  Diet Order   Procedures    Diet: Regular/House; Fluid Consistency: Thin (IDDSI 0)         Discharge Activity:         CODE STATUS:  Code Status and Medical Interventions:   Ordered at: 04/23/24 7416     Code Status (Patient has no pulse and is not breathing):    CPR (Attempt to Resuscitate)     Medical Interventions (Patient has pulse or is breathing):    Full Support         No future appointments.    Additional Instructions for the Follow-ups that You Need to Schedule       Discharge Follow-up with Specified Provider: Follow up with cardiology Dr. Almeida in 6 weeks; 6 Weeks   As directed      To: Follow up with cardiology Dr. Almeida in 6 weeks   Follow Up: 6 Weeks                Time spent on Discharge including face to face service:  >30 minutes    Signature: Electronically signed by Efren Posada  MD, 04/25/24, 12:10 EDT.  [facility] Hospitalist Team

## 2024-04-25 NOTE — CASE MANAGEMENT/SOCIAL WORK
Case Management Discharge Note      Final Note: Home    Provided Post Acute Provider List?: N/A    Selected Continued Care - Discharged on 4/25/2024 Admission date: 4/23/2024 - Discharge disposition: Home or Self Care         Transportation Services  Private: Car    Final Discharge Disposition Code: 01 - home or self-care

## 2024-06-12 ENCOUNTER — TELEPHONE (OUTPATIENT)
Dept: CARDIOLOGY | Facility: CLINIC | Age: 64
End: 2024-06-12
Payer: COMMERCIAL

## 2024-06-12 NOTE — TELEPHONE ENCOUNTER
Let pt know there was no signficant bradycardia on monitor   Isolated pvcs   Make f/u if recurrent cardiac issues/concerns     Hub can read